# Patient Record
Sex: MALE | Race: WHITE | Employment: FULL TIME | URBAN - METROPOLITAN AREA
[De-identification: names, ages, dates, MRNs, and addresses within clinical notes are randomized per-mention and may not be internally consistent; named-entity substitution may affect disease eponyms.]

---

## 2019-09-10 ENCOUNTER — HOSPITAL ENCOUNTER (EMERGENCY)
Age: 44
Discharge: HOME | End: 2019-09-11
Attending: EMERGENCY MEDICINE

## 2019-09-10 ENCOUNTER — APPOINTMENT (OUTPATIENT)
Dept: GENERAL RADIOLOGY | Age: 44
End: 2019-09-10

## 2019-09-10 VITALS
TEMPERATURE: 99.3 F | HEIGHT: 72 IN | SYSTOLIC BLOOD PRESSURE: 119 MMHG | OXYGEN SATURATION: 98 % | BODY MASS INDEX: 31.02 KG/M2 | HEART RATE: 97 BPM | WEIGHT: 229 LBS | DIASTOLIC BLOOD PRESSURE: 84 MMHG

## 2019-09-10 DIAGNOSIS — J06.9 UPPER RESPIRATORY TRACT INFECTION, UNSPECIFIED TYPE: Primary | ICD-10-CM

## 2019-09-10 PROCEDURE — 99284 EMERGENCY DEPT VISIT MOD MDM: CPT

## 2019-09-10 PROCEDURE — 71046 X-RAY EXAM CHEST 2 VIEWS: CPT

## 2019-09-10 RX ORDER — FLUTICASONE PROPIONATE 50 MCG
1 SPRAY, SUSPENSION (ML) NASAL DAILY
COMMUNITY

## 2019-09-10 RX ORDER — PREDNISONE 20 MG/1
40 TABLET ORAL DAILY
Qty: 10 TABLET | Refills: 0 | Status: SHIPPED | OUTPATIENT
Start: 2019-09-10 | End: 2019-09-15

## 2019-09-10 RX ORDER — SULFAMETHOXAZOLE AND TRIMETHOPRIM 200; 40 MG/5ML; MG/5ML
160 SUSPENSION ORAL 2 TIMES DAILY
COMMUNITY
End: 2020-11-04

## 2019-09-10 RX ORDER — GUAIFENESIN, PSEUDOEPHEDRINE HYDROCHLORIDE 600; 60 MG/1; MG/1
1 TABLET, EXTENDED RELEASE ORAL EVERY 12 HOURS PRN
Qty: 20 TABLET | Refills: 0 | Status: SHIPPED | OUTPATIENT
Start: 2019-09-10 | End: 2020-11-04

## 2019-09-10 ASSESSMENT — ENCOUNTER SYMPTOMS
DIARRHEA: 0
TROUBLE SWALLOWING: 0
EYES NEGATIVE: 1
VOMITING: 1
COUGH: 1
ABDOMINAL PAIN: 0
SORE THROAT: 0
CONSTIPATION: 0

## 2019-09-10 ASSESSMENT — PAIN DESCRIPTION - PAIN TYPE: TYPE: ACUTE PAIN

## 2019-09-10 ASSESSMENT — PAIN SCALES - GENERAL: PAINLEVEL_OUTOF10: 6

## 2019-09-10 NOTE — LETTER
[facility] Emergency Department  [facility] [address] [address]  Phone: [phone]  Fax: [phone]               September 11, 2019    Patient: Gris Sullivan   YOB: 1975   Date of Visit: 9/10/2019       To Whom It May Concern:    Linda Bhatt was seen and treated in our emergency department on 9/10/2019. He may return to work on 9/12/19.       Sincerely,       Jordan Ospina MD         Signature:__________________________________

## 2020-06-09 ENCOUNTER — APPOINTMENT (OUTPATIENT)
Dept: GENERAL RADIOLOGY | Age: 45
End: 2020-06-09

## 2020-06-09 ENCOUNTER — HOSPITAL ENCOUNTER (EMERGENCY)
Age: 45
Discharge: HOME | End: 2020-06-09
Attending: EMERGENCY MEDICINE

## 2020-06-09 VITALS
RESPIRATION RATE: 19 BRPM | TEMPERATURE: 98.9 F | SYSTOLIC BLOOD PRESSURE: 147 MMHG | HEART RATE: 81 BPM | OXYGEN SATURATION: 99 % | DIASTOLIC BLOOD PRESSURE: 89 MMHG

## 2020-06-09 LAB
ANION GAP SERPL CALCULATED.3IONS-SCNC: 11 MMOL/L (ref 3–16)
BASOPHILS ABSOLUTE: 0.1 K/UL (ref 0–0.2)
BASOPHILS RELATIVE PERCENT: 1 %
BILIRUBIN URINE: NEGATIVE
BLOOD, URINE: NEGATIVE
BUN BLDV-MCNC: 11 MG/DL (ref 7–20)
CALCIUM SERPL-MCNC: 9.2 MG/DL (ref 8.3–10.6)
CHLORIDE BLD-SCNC: 101 MMOL/L (ref 99–110)
CHP ED QC CHECK: NORMAL
CLARITY: CLEAR
CO2: 26 MMOL/L (ref 21–32)
COLOR: YELLOW
CREAT SERPL-MCNC: 0.7 MG/DL (ref 0.9–1.3)
EOSINOPHILS ABSOLUTE: 0.2 K/UL (ref 0–0.6)
EOSINOPHILS RELATIVE PERCENT: 1.8 %
GFR AFRICAN AMERICAN: >60
GFR NON-AFRICAN AMERICAN: >60
GLUCOSE BLD-MCNC: 231 MG/DL
GLUCOSE BLD-MCNC: 231 MG/DL (ref 70–99)
GLUCOSE BLD-MCNC: 254 MG/DL (ref 70–99)
GLUCOSE URINE: 500 MG/DL
HCT VFR BLD CALC: 49.3 % (ref 40.5–52.5)
HEMOGLOBIN: 17.2 G/DL (ref 13.5–17.5)
KETONES, URINE: NEGATIVE MG/DL
LEUKOCYTE ESTERASE, URINE: NEGATIVE
LYMPHOCYTES ABSOLUTE: 2.3 K/UL (ref 1–5.1)
LYMPHOCYTES RELATIVE PERCENT: 23.7 %
MCH RBC QN AUTO: 30.6 PG (ref 26–34)
MCHC RBC AUTO-ENTMCNC: 34.9 G/DL (ref 31–36)
MCV RBC AUTO: 87.8 FL (ref 80–100)
MICROSCOPIC EXAMINATION: YES
MONOCYTES ABSOLUTE: 0.7 K/UL (ref 0–1.3)
MONOCYTES RELATIVE PERCENT: 7.8 %
NEUTROPHILS ABSOLUTE: 6.3 K/UL (ref 1.7–7.7)
NEUTROPHILS RELATIVE PERCENT: 65.7 %
NITRITE, URINE: NEGATIVE
PDW BLD-RTO: 13.1 % (ref 12.4–15.4)
PERFORMED ON: ABNORMAL
PH UA: 6 (ref 5–8)
PLATELET # BLD: 224 K/UL (ref 135–450)
PMV BLD AUTO: 9 FL (ref 5–10.5)
POTASSIUM REFLEX MAGNESIUM: 4.5 MMOL/L (ref 3.5–5.1)
PROTEIN UA: 30 MG/DL
RBC # BLD: 5.61 M/UL (ref 4.2–5.9)
RBC UA: NORMAL /HPF (ref 0–4)
SODIUM BLD-SCNC: 138 MMOL/L (ref 136–145)
SPECIFIC GRAVITY UA: 1.02 (ref 1–1.03)
URINE TYPE: ABNORMAL
UROBILINOGEN, URINE: 0.2 E.U./DL
WBC # BLD: 9.5 K/UL (ref 4–11)
WBC UA: NORMAL /HPF (ref 0–5)

## 2020-06-09 PROCEDURE — 73610 X-RAY EXAM OF ANKLE: CPT

## 2020-06-09 PROCEDURE — 99283 EMERGENCY DEPT VISIT LOW MDM: CPT

## 2020-06-09 PROCEDURE — 85025 COMPLETE CBC W/AUTO DIFF WBC: CPT

## 2020-06-09 PROCEDURE — 80048 BASIC METABOLIC PNL TOTAL CA: CPT

## 2020-06-09 PROCEDURE — 73630 X-RAY EXAM OF FOOT: CPT

## 2020-06-09 PROCEDURE — 81001 URINALYSIS AUTO W/SCOPE: CPT

## 2020-06-09 ASSESSMENT — ENCOUNTER SYMPTOMS
EYE DISCHARGE: 0
SHORTNESS OF BREATH: 0
COUGH: 0
NAUSEA: 0
VOMITING: 0
EYE REDNESS: 0
SORE THROAT: 0
ABDOMINAL PAIN: 0

## 2020-06-09 ASSESSMENT — PAIN SCALES - GENERAL: PAINLEVEL_OUTOF10: 8

## 2020-06-09 NOTE — ED PROVIDER NOTES
[address] ENCOUNTER          PHYSICIAN ASSISTANT NOTE       Date of evaluation: 6/9/2020    Chief Complaint     Foot Injury    History of Present Illness     HPI:  This is a 54 y.o. 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 He states he cannot bear weight on the right foot. He denies ankle or knee pain. He states he has chronic 4-5 year \"numbness/pins and needle\" sensation to the BL lower extremities. He denies fever, chills, nausea, vomiting, chest pain, shortness of breath, abdominal pain or changes to bowel or bladder habits. He denies any calf pain. With the exception of the above, there are no aggravating or alleviating factors. Review of Systems     Review of Systems   Constitutional: Negative for chills and fever. HENT: Negative for sore throat. Eyes: Negative for discharge and redness. Respiratory: Negative for cough and shortness of breath. Cardiovascular: Negative for chest pain. Gastrointestinal: Negative for abdominal pain, nausea and vomiting. Genitourinary: Negative for dysuria and hematuria. Musculoskeletal: Positive for gait problem (cant bear weight on right foot). Skin: Negative for rash. Neurological: Negative for facial asymmetry and light-headedness. Psychiatric/Behavioral: Negative. Past Medical, Surgical, Family, and Social History     He has no past medical history on file. He has a past surgical history that includes Tonsillectomy and fracture surgery. His family history is not on file. He reports that he has been smoking cigarettes. He has been smoking about 2.50 packs per day. He has never used smokeless tobacco. He reports current alcohol use.  He reports that bunion present. No deformity. Neurological:      General: No focal deficit present. Mental Status: He is alert and oriented to person, place, and time. Comments: Sensation grossly intact to light touch in the lateral, medial, plantar and dorsal surfaces of right foot   Psychiatric:         Mood and Affect: Mood normal.         Behavior: Behavior normal.       Diagnostic Results     RADIOLOGY:  XR FOOT RIGHT (MIN 3 VIEWS)   Final Result      1. No acute fracture. 2. Degenerative changes as above. 3. Hallux valgus deformity on these nonweightbearing views. 4. Probable remote fracture medial malleolus. XR ANKLE RIGHT (MIN 3 VIEWS)   Final Result      1. No acute fracture.               LABS:   Results for orders placed or performed during the hospital encounter of 06/09/20   Urinalysis, reflex to microscopic   Result Value Ref Range    Color, UA Yellow Straw/Yellow    Clarity, UA Clear Clear    Glucose, Ur 500 (A) Negative mg/dL    Bilirubin Urine Negative Negative    Ketones, Urine Negative Negative mg/dL    Specific Gravity, UA 1.025 1.005 - 1.030    Blood, Urine Negative Negative    pH, UA 6.0 5.0 - 8.0    Protein, UA 30 (A) Negative mg/dL    Urobilinogen, Urine 0.2 <2.0 E.U./dL    Nitrite, Urine Negative Negative    Leukocyte Esterase, Urine Negative Negative    Microscopic Examination YES     Urine Type NotGiven    Basic Metabolic Panel w/ Reflex to MG   Result Value Ref Range    Sodium 138 136 - 145 mmol/L    Potassium reflex Magnesium 4.5 3.5 - 5.1 mmol/L    Chloride 101 99 - 110 mmol/L    CO2 26 21 - 32 mmol/L    Anion Gap 11 3 - 16    Glucose 254 (H) 70 - 99 mg/dL    BUN 11 7 - 20 mg/dL    CREATININE 0.7 (L) 0.9 - 1.3 mg/dL    GFR Non-African American >60 >60    GFR African American >60 >60    Calcium 9.2 8.3 - 10.6 mg/dL   CBC Auto Differential   Result Value Ref Range    WBC 9.5 4.0 - 11.0 K/uL    RBC 5.61 4.20 - 5.90 M/uL    Hemoglobin 17.2 13.5 - 17.5 g/dL    Hematocrit 49.3 40.5 - 52.5 %    MCV 87.8 80.0 - 100.0 fL    MCH 30.6 26.0 - 34.0 pg    MCHC 34.9 31.0 - 36.0 g/dL    RDW 13.1 12.4 - 15.4 %    Platelets 322 393 - 740 K/uL    MPV 9.0 5.0 - 10.5 fL    Neutrophils % 65.7 %    Lymphocytes % 23.7 %    Monocytes % 7.8 %    Eosinophils % 1.8 %    Basophils % 1.0 %    Neutrophils Absolute 6.3 1.7 - 7.7 K/uL    Lymphocytes Absolute 2.3 1.0 - 5.1 K/uL    Monocytes Absolute 0.7 0.0 - 1.3 K/uL    Eosinophils Absolute 0.2 0.0 - 0.6 K/uL    Basophils Absolute 0.1 0.0 - 0.2 K/uL   Microscopic Urinalysis   Result Value Ref Range    WBC, UA 0-2 0 - 5 /HPF    RBC, UA None seen 0 - 4 /HPF   POC Glucose   Result Value Ref Range    Glucose 231 mg/dL    QC OK? y    POCT Glucose   Result Value Ref Range    POC Glucose 231 (H) 70 - 99 mg/dl    Performed on ACCU-CHEK        RECENT VITALS:  BP: (!) 147/89, Temp: 98.9 °F (37.2 °C), Pulse: 81, Resp: 19, SpO2: 99 %     Procedures     None    ED Course     Nursing Notes, Past Medical Hx,Past Surgical Hx, Social Hx, Allergies, and Family Hx were reviewed. The patient was given the following medications:  Orders Placed This Encounter   Medications    metFORMIN (GLUCOPHAGE) 500 MG tablet     Sig: Take 1 tablet by mouth daily (with breakfast)     Dispense:  30 tablet     Refill:  0       CONSULTS:  None    MEDICAL DECISION MAKING / ASSESSMENT / PLAN   Vital signs, medical history, social history, allergies and nursing notes reviewed. Vitals:  BP (!) 147/89   Pulse 81   Temp 98.9 °F (37.2 °C) (Oral)   Resp 19   SpO2 99%     Briefly this is a 54 y.o. male who presents to the emergency department with foot injury. Patient presented afebrile with normal vitals . Patient was in no acute distress, nontoxic and non-hypoxic. Patient was able to complete full sentences at bedside. Patient was not using accessory muscles. Patient was able to cooperate with history and physical exam.  Patient's previous charts, labs and imaging was reviewed.   Please see HPI and physical for further details. On exam, patient overall well-appearing, he is in no acute distress. Lungs are clear to auscultation bilaterally and heart rate and rhythm are regular. The patient has tenderness palpation to the dorsum of the right foot most prominent over the lateral aspect of the right foot as well as the second metatarsal.  He has 2+ dorsalis pedis pulses and has sensation intact to light touch to the dorsum, plantar, lateral and medial aspects of the right foot. He is unable to bear weight to the right foot. Plain radiographs of the right foot and right ankle reveal no acute osseous abnormalities, he does have hallux valgus to the right foot which is consistent with his physical exam.  Given mechanism of injury as well as physical exam there is concern for potential occult fracture such as fifth metatarsal fracture, less likely Lisfranc injury. Even his inability to bear weight we will place the patient in a short leg splint and provide him follow-up with podiatry, the possibility of occult fracture is discussed with the patient and he understands the importance of follow-up with podiatry to ensure he is no complications of possible occult fracture. He is also provided crutches for comfort and is encouraged not to bear weight until his follow-up with podiatry. Additionally, the patient states he has concerns that he may have diabetes given chronic numbness to his bilateral lower extremities from the knee down, his point-of-care glucose today is 231, laboratory studies include CBC which reveals no leukocytosis or anemia, basic metabolic panel reveals glucose of 254, creatinine is 0.7 BUN is within normal limits no electrolyte abnormalities are identified. Urinalysis reveals 500 glucose and 30 protein, no signs of infection are noted. These findings are consistent with hyperglycemia and possible nephrotic syndrome secondary to untreated diabetes.   The patient is informed of these findings as well as the complications of chronic untreated diabetes and the importance of appropriate primary care follow-up, he is provided a referral to the [facility] resident clinic as well as to the [facility] podiatry clinic. He will be started on metformin today and he is provided educational information regarding diet and diabetes. He is hemodynamically stable throughout his ED course, he has tolerated all intervention today without complication. The patient was seen and evaluated by the attending physician Dr. Charlotte Moreland who agreed with the assessment and plan. The patient and / or the family were informed of the results of any tests, a time was given to answer questions, a plan was proposed and they agreed with plan. At this point in time, patient is stable for discharge. Patient given strict return precautions as outlined in the AVS. Patient was agreeable and understanding to this plan of care. Prior to discharge, patient was ambulatory and PO tolerant. Clinical Impression     1. Hyperglycemia    2.  Injury of right foot, initial encounter        Disposition     PATIENT REFERRED TO:  [facility]  [address]   [phone]    Schedule an appointment as soon as possible for a visit in 1 week  For ED follow up regarding likely diabetes    [address]  [phone]    Schedule an appointment as soon as possible for a visit in 3 days  For re-evaluation of foot injury    [facility] Emergency Department  [address]  [phone]    If symptoms worsen      DISCHARGE MEDICATIONS:  Discharge Medication List as of 6/9/2020 12:34 PM      START taking these medications    Details   metFORMIN (GLUCOPHAGE) 500 MG tablet Take 1 tablet by mouth daily (with breakfast), Disp-30 tablet, R-0Print             DISPOSITION     Discharge      Epifanio Sylvester PA-C  06/09/20 [id number]

## 2020-06-09 NOTE — ED NOTES
Patient discharged to home with all belongings. All discharge and follow up information discussed. Patient verbalized understanding and denies needs or questions. Patient is alert, oriented x4, no signs of acute distress.       Stephanie Reynolds RN  06/09/20 [id number]

## 2020-06-09 NOTE — ED PROVIDER NOTES
ED Attending Attestation Note     Date of evaluation: 6/9/2020    This patient was seen by the advance practice provider. I have seen and examined the patient, agree with the workup, evaluation, management and diagnosis. The care plan has been discussed. My assessment reveals an overall well-appearing gentleman, in no acute distress. He presents with pain along the dorsal lateral aspect of the right midfoot, which occurred with an inversion injury while standing up from a rolling chair. Plain films show no fracture, but patient is not able to bear weight, so a splint was placed and he is being referred to podiatry.        Jong Sanchez MD  06/18/20 [id number]

## 2020-06-10 ENCOUNTER — TELEPHONE (OUTPATIENT)
Dept: PRIMARY CARE CLINIC | Age: 45
End: 2020-06-10

## 2020-11-03 ENCOUNTER — NURSE TRIAGE (OUTPATIENT)
Dept: OTHER | Facility: CLINIC | Age: 45
End: 2020-11-03

## 2020-11-03 NOTE — TELEPHONE ENCOUNTER
Numbness and tingling from feet all the way up leg, thinks it might be diabetic neuropathy. States it is worse at night Tingling from left ring finger all the way up to chest. Pt reported heart palpatations and shortness of breath for 2-3 minutes-2 days ago. Reason for Disposition   Numbness or tingling in one or both feet is a chronic symptom (recurrent or ongoing problem lasting > 4 weeks)    Answer Assessment - Initial Assessment Questions  1. SYMPTOM: \"What is the main symptom you are concerned about? \" (e.g., weakness, numbness)      See above    2. ONSET: \"When did this start? \" (minutes, hours, days; while sleeping)      About 4 years ago    3. LAST NORMAL: \"When was the last time you were normal (no symptoms)? \"      See above    4. PATTERN \"Does this come and go, or has it been constant since it started? \"  \"Is it present now? \"      Constant    5. CARDIAC SYMPTOMS: \"Have you had any of the following symptoms: chest pain, difficulty breathing, palpitations? \"      Palpitations and SOB 2 nights ago    6. NEUROLOGIC SYMPTOMS: \"Have you had any of the following symptoms: headache, dizziness, vision loss, double vision, changes in speech, unsteady on your feet? \"      Denies    7. OTHER SYMPTOMS: \"Do you have any other symptoms? \"      See above    8. PREGNANCY: \"Is there any chance you are pregnant? \" \"When was your last menstrual period? \"      Na    Protocols used: NEUROLOGIC DEFICIT-ADULT-[place]      Patient called pre-service center [facility]) to schedule appointment, with red flag complaint, transferred to RN access for triage. See above questions and answers. Caller talking full sentences without any distress on phone. Discussed disposition and patient agreeable. Discussed potential consequences for not following disposition recommendation. Aware to call back with any concerns or persistent, worsening, or new symptoms develop. Warm transfer to [facility] scheduling for appointment.     Attention Provider: Thank you for allowing me to participate in the care of your patient. The  patient was connected to triage in response to information provided to the ECC. Please do not respond through this encounter as the response is not directed to a shared pool.

## 2020-11-04 ENCOUNTER — OFFICE VISIT (OUTPATIENT)
Dept: FAMILY MEDICINE CLINIC | Age: 45
End: 2020-11-04

## 2020-11-04 VITALS
WEIGHT: 240 LBS | HEIGHT: 72 IN | OXYGEN SATURATION: 97 % | DIASTOLIC BLOOD PRESSURE: 84 MMHG | HEART RATE: 70 BPM | SYSTOLIC BLOOD PRESSURE: 118 MMHG | TEMPERATURE: 97.7 F | BODY MASS INDEX: 32.51 KG/M2

## 2020-11-04 LAB — HBA1C MFR BLD: 9.8 %

## 2020-11-04 PROCEDURE — 83036 HEMOGLOBIN GLYCOSYLATED A1C: CPT | Performed by: FAMILY MEDICINE

## 2020-11-04 PROCEDURE — 99203 OFFICE O/P NEW LOW 30 MIN: CPT | Performed by: FAMILY MEDICINE

## 2020-11-04 RX ORDER — CETIRIZINE HYDROCHLORIDE 10 MG/1
10 TABLET ORAL DAILY
COMMUNITY

## 2020-11-04 RX ORDER — AZITHROMYCIN 500 MG/1
500 TABLET, FILM COATED ORAL DAILY
Qty: 1 PACKET | Refills: 0 | Status: SHIPPED | OUTPATIENT
Start: 2020-11-04 | End: 2020-11-07

## 2020-11-04 ASSESSMENT — ENCOUNTER SYMPTOMS
COUGH: 1
BLOOD IN STOOL: 0
NAUSEA: 0
SHORTNESS OF BREATH: 0
VOMITING: 0
SORE THROAT: 0
ABDOMINAL PAIN: 0

## 2020-11-04 ASSESSMENT — PATIENT HEALTH QUESTIONNAIRE - PHQ9
1. LITTLE INTEREST OR PLEASURE IN DOING THINGS: 0
2. FEELING DOWN, DEPRESSED OR HOPELESS: 0
SUM OF ALL RESPONSES TO PHQ QUESTIONS 1-9: 0
SUM OF ALL RESPONSES TO PHQ QUESTIONS 1-9: 0
SUM OF ALL RESPONSES TO PHQ9 QUESTIONS 1 & 2: 0
SUM OF ALL RESPONSES TO PHQ QUESTIONS 1-9: 0

## 2020-11-04 NOTE — PROGRESS NOTES
Chief Complaint   Patient presents with    New Patient     Hasn't had insurance/pcp.  Numbness     foot numbess (pins and needles) x3-4 years    Finger Pain     left ring finger shooting up left arm.  Palpitations     Heart palpitations for about 3-4 minutes about 3 days ago.  Congestion     Patient complains of \"chest cold\"     Erectile Dysfunction           Jen Chance is a 54 y.o. male who presents to be established. Pt reports a tingling shooting feeling from his L ring finger into his L shoulder 3 days ago which was followed by palpitations which lasted about 3 min. Pt denies any associated CP or SOB. Of note pt has been taking an OTC decongestant daily for PND that he has been dealing with over the past month     Pt was Dx with diabetes back in 06/2020 during an ER visit in which he fractured bones in his R foot and was Rx metformin 500 mg daily. Pt does not check his blood sugars and has been trying to wean down his soda use. Pt did see Podiatry regarding his R foot fractures and was in a [facility] for 6 weeks with good healing    Pt does have a hx of allergies and takes OTC zyrtec and flonase medications daily     Pt is a smoker of 2.5 PPD; positive alcohol use 6 pack beer twice a week; Positive marijuana use nightly for chronic foot burning pain x 4 years    FHx negative for CAD or diabetes; Pt is unemployed and has no medical insurance; Pt states that he does eat a lot of salty foods      Review of Systems   Constitutional: Positive for fatigue. Negative for chills and fever. HENT: Positive for congestion (x 1 month) and postnasal drip (x 1 month). Negative for nosebleeds and sore throat. Negative loss of taste or smell   Eyes:        Negative blurred vision or diplopia   Respiratory: Positive for cough (mucousy x 1 month). Negative for shortness of breath. Cardiovascular: Positive for palpitations (episode 3 days ago) and leg swelling. Negative for chest pain. Gastrointestinal: Negative for abdominal pain, blood in stool, nausea and vomiting. Negative melena or indigestion   Endocrine: Negative for polydipsia and polyuria. Genitourinary: Negative for dysuria and hematuria. Positive erectile dysfunction at times   Musculoskeletal: Negative for arthralgias. Skin: Negative for rash. Neurological: Negative for dizziness, seizures, syncope, speech difficulty, weakness and headaches. Positive chronic constant burning pain in feet x 4 years   Psychiatric/Behavioral: Negative for dysphoric mood. The patient is not nervous/anxious. Vitals:    11/04/20 0908   BP: 118/84   Pulse: 70   Temp: 97.7 °F (36.5 °C)   SpO2: 97%     HgBA1c = 9.8     Physical Exam  Vitals signs reviewed. Constitutional:       General: He is not in acute distress. HENT:      Nose:      Comments: No sinus tendernous to percussion noted      Mouth/Throat:      Mouth: Mucous membranes are moist.      Pharynx: Oropharynx is clear. Eyes:      General: No scleral icterus. Comments: Pink conjunctivae    Neck:      Thyroid: No thyromegaly. Comments: No carotid bruits noted B/L  Cardiovascular:      Heart sounds: Normal heart sounds. No murmur. No friction rub. No gallop. Pulmonary:      Effort: Pulmonary effort is normal.      Breath sounds: Normal breath sounds. Abdominal:      Palpations: Abdomen is soft. Tenderness: There is no abdominal tenderness. Musculoskeletal:      Comments: Positive 4+ leg edema but no calf tendernous to palpation noted B/L   Lymphadenopathy:      Cervical: No cervical adenopathy. Skin:     Findings: No rash. Neurological:      Mental Status: He is alert. Comments: Cranial nerves 2 - 12 grossly intact; Muscle strength 5/5 throughout   Psychiatric:         Mood and Affect: Mood normal.         ASSESSMENT AND PLAN    1.  Chronic allergic rhinitis  Pt was told to continue using flonase but to hold his zyrtec medication for 1 week while on antibiotics. 2. Acute sinusitis, recurrence not specified, unspecified location  Will treat patient with antibiotics and patient was told to notify MD if you note no improvement in 3 - 4 days. Pt is with clear lung fields on PE and was told to stop taking OTC decongestant medication as it is the most likely cause of his recent episode of palpitations  - azithromycin (ZITHROMAX) 500 MG tablet; Take 1 tablet by mouth daily for 3 days  Dispense: 1 packet; Refill: 0    3. Tobacco abuse  Patient was advised to Quit Smoking and smoking cessation techniques were discussed with patient (time spent > 3 min)    4. Type 2 diabetes mellitus with diabetic neuropathy, without long-term current use of insulin (HCC)  Is uncontrolled with HgbA1c level 9.8 and will thus increase pt's metformin dosage and patient was told to follow a stricter diabetic diet; avoiding concentrated sweets and consuming a low carbohydrate diet and will reevaluate pt in 6 weeks. Pt's chronic burning foot pain is most likely a secondary neuropathy from this condition  - POCT glycosylated hemoglobin (Hb A1C)  - metFORMIN (GLUCOPHAGE) 500 MG tablet; Take 1 tablet by mouth 2 times daily (with meals)  Dispense: 60 tablet; Refill: 1  - CBC Auto Differential; Future  - Comprehensive Metabolic Panel; Future  - TSH with Reflex; Future    5. Leg swelling  Unsure as to etiology and will check bloodwork for further evaluation and pt was told to follow a low sodium diet and to keep his legs elevated as much as possible. Pt is with stable VS and has no lung rales on PE  - CBC Auto Differential; Future  - Comprehensive Metabolic Panel; Future  - TSH with Reflex; Future        Cristela Palm MD      Return in about 6 weeks (around 12/16/2020).        Patient Instructions     Patient Education      Go to the ER ASAP if you develop any chest pain, shortness of breath, facial droop, slurred speech, weakness/numbness in your arms or legs or double vision! Learning About Diabetes Food Guidelines  Your Care Instructions     Meal planning is important to manage diabetes. It helps keep your blood sugar at a target level (which you set with your doctor). You don't have to eat special foods. You can eat what your family eats, including sweets once in a while. But you do have to pay attention to how often you eat and how much you eat of certain foods. You may want to work with a dietitian or a certified diabetes educator (CDE) to help you plan meals and snacks. A dietitian or CDE can also help you lose weight if that is one of your goals. What should you know about eating carbs? Managing the amount of carbohydrate (carbs) you eat is an important part of healthy meals when you have diabetes. Carbohydrate is found in many foods. · Learn which foods have carbs. And learn the amounts of carbs in different foods. ? Bread, cereal, pasta, and rice have about 15 grams of carbs in a serving. A serving is 1 slice of bread (1 ounce), ½ cup of cooked cereal, or 1/3 cup of cooked pasta or rice. ? Fruits have 15 grams of carbs in a serving. A serving is 1 small fresh fruit, such as an apple or orange; ½ of a banana; ½ cup of cooked or canned fruit; ½ cup of fruit juice; 1 cup of melon or raspberries; or 2 tablespoons of dried fruit. ? Milk and no-sugar-added yogurt have 15 grams of carbs in a serving. A serving is 1 cup of milk or 2/3 cup of no-sugar-added yogurt. ? Starchy vegetables have 15 grams of carbs in a serving. A serving is ½ cup of mashed potatoes or sweet potato; 1 cup winter squash; ½ of a small baked potato; ½ cup of cooked beans; or ½ cup cooked corn or green peas. · Learn how much carbs to eat each day and at each meal. A dietitian or CDE can teach you how to keep track of the amount of carbs you eat. This is called carbohydrate counting. · If you are not sure how to count carbohydrate grams, use the Plate Method to plan meals.  It is a good, quick way to make sure that you have a balanced meal. It also helps you spread carbs throughout the day. ? Divide your plate by types of foods. Put non-starchy vegetables on half the plate, meat or other protein food on one-quarter of the plate, and a grain or starchy vegetable in the final quarter of the plate. To this you can add a small piece of fruit and 1 cup of milk or yogurt, depending on how many carbs you are supposed to eat at a meal.  · Try to eat about the same amount of carbs at each meal. Do not \"save up\" your daily allowance of carbs to eat at one meal.  · Proteins have very little or no carbs per serving. Examples of proteins are beef, chicken, turkey, fish, eggs, tofu, cheese, cottage cheese, and peanut butter. A serving size of meat is 3 ounces, which is about the size of a deck of cards. Examples of meat substitute serving sizes (equal to 1 ounce of meat) are 1/4 cup of cottage cheese, 1 egg, 1 tablespoon of peanut butter, and ½ cup of tofu. How can you eat out and still eat healthy? · Learn to estimate the serving sizes of foods that have carbohydrate. If you measure food at home, it will be easier to estimate the amount in a serving of restaurant food. · If the meal you order has too much carbohydrate (such as potatoes, corn, or baked beans), ask to have a low-carbohydrate food instead. Ask for a salad or green vegetables. · If you use insulin, check your blood sugar before and after eating out to help you plan how much to eat in the future. · If you eat more carbohydrate at a meal than you had planned, take a walk or do other exercise. This will help lower your blood sugar. What else should you know? · Limit saturated fat, such as the fat from meat and dairy products. This is a healthy choice because people who have diabetes are at higher risk of heart disease. So choose lean cuts of meat and nonfat or low-fat dairy products.  Use olive or canola oil instead of butter or shortening when cooking. · Don't skip meals. Your blood sugar may drop too low if you skip meals and take insulin or certain medicines for diabetes. · Check with your doctor before you drink alcohol. Alcohol can cause your blood sugar to drop too low. Alcohol can also cause a bad reaction if you take certain diabetes medicines. Follow-up care is a key part of your treatment and safety. Be sure to make and go to all appointments, and call your doctor if you are having problems. It's also a good idea to know your test results and keep a list of the medicines you take. Where can you learn more? Go to https://Nextlandingpepiceweb.2houses. org and sign in to your "[employer]" account. Enter L636 in the [employer] box to learn more about \"Learning About Diabetes Food Guidelines. \"     If you do not have an account, please click on the \"Sign Up Now\" link. Current as of: December 20, 2019               Content Version: 12.6  © 9013-2667 [employer]. Care instructions adapted under license by [facility] ([facility]). If you have questions about a medical condition or this instruction, always ask your healthcare professional. [place] [address] any warranty or liability for your use of this information.

## 2020-11-04 NOTE — PATIENT INSTRUCTIONS
Patient Education      Go to the ER ASAP if you develop any chest pain, shortness of breath, facial droop, slurred speech, weakness/numbness in your arms or legs or double vision! Learning About Diabetes Food Guidelines  Your Care Instructions     Meal planning is important to manage diabetes. It helps keep your blood sugar at a target level (which you set with your doctor). You don't have to eat special foods. You can eat what your family eats, including sweets once in a while. But you do have to pay attention to how often you eat and how much you eat of certain foods. You may want to work with a dietitian or a certified diabetes educator (CDE) to help you plan meals and snacks. A dietitian or CDE can also help you lose weight if that is one of your goals. What should you know about eating carbs? Managing the amount of carbohydrate (carbs) you eat is an important part of healthy meals when you have diabetes. Carbohydrate is found in many foods. · Learn which foods have carbs. And learn the amounts of carbs in different foods. ? Bread, cereal, pasta, and rice have about 15 grams of carbs in a serving. A serving is 1 slice of bread (1 ounce), ½ cup of cooked cereal, or 1/3 cup of cooked pasta or rice. ? Fruits have 15 grams of carbs in a serving. A serving is 1 small fresh fruit, such as an apple or orange; ½ of a banana; ½ cup of cooked or canned fruit; ½ cup of fruit juice; 1 cup of melon or raspberries; or 2 tablespoons of dried fruit. ? Milk and no-sugar-added yogurt have 15 grams of carbs in a serving. A serving is 1 cup of milk or 2/3 cup of no-sugar-added yogurt. ? Starchy vegetables have 15 grams of carbs in a serving. A serving is ½ cup of mashed potatoes or sweet potato; 1 cup winter squash; ½ of a small baked potato; ½ cup of cooked beans; or ½ cup cooked corn or green peas.   · Learn how much carbs to eat each day and at each meal. A dietitian or CDE can teach you how to keep track of the amount of carbs you eat. This is called carbohydrate counting. · If you are not sure how to count carbohydrate grams, use the Plate Method to plan meals. It is a good, quick way to make sure that you have a balanced meal. It also helps you spread carbs throughout the day. ? Divide your plate by types of foods. Put non-starchy vegetables on half the plate, meat or other protein food on one-quarter of the plate, and a grain or starchy vegetable in the final quarter of the plate. To this you can add a small piece of fruit and 1 cup of milk or yogurt, depending on how many carbs you are supposed to eat at a meal.  · Try to eat about the same amount of carbs at each meal. Do not \"save up\" your daily allowance of carbs to eat at one meal.  · Proteins have very little or no carbs per serving. Examples of proteins are beef, chicken, turkey, fish, eggs, tofu, cheese, cottage cheese, and peanut butter. A serving size of meat is 3 ounces, which is about the size of a deck of cards. Examples of meat substitute serving sizes (equal to 1 ounce of meat) are 1/4 cup of cottage cheese, 1 egg, 1 tablespoon of peanut butter, and ½ cup of tofu. How can you eat out and still eat healthy? · Learn to estimate the serving sizes of foods that have carbohydrate. If you measure food at home, it will be easier to estimate the amount in a serving of restaurant food. · If the meal you order has too much carbohydrate (such as potatoes, corn, or baked beans), ask to have a low-carbohydrate food instead. Ask for a salad or green vegetables. · If you use insulin, check your blood sugar before and after eating out to help you plan how much to eat in the future. · If you eat more carbohydrate at a meal than you had planned, take a walk or do other exercise. This will help lower your blood sugar. What else should you know? · Limit saturated fat, such as the fat from meat and dairy products.  This is a healthy choice because people who have diabetes are at higher risk of heart disease. So choose lean cuts of meat and nonfat or low-fat dairy products. Use olive or canola oil instead of butter or shortening when cooking. · Don't skip meals. Your blood sugar may drop too low if you skip meals and take insulin or certain medicines for diabetes. · Check with your doctor before you drink alcohol. Alcohol can cause your blood sugar to drop too low. Alcohol can also cause a bad reaction if you take certain diabetes medicines. Follow-up care is a key part of your treatment and safety. Be sure to make and go to all appointments, and call your doctor if you are having problems. It's also a good idea to know your test results and keep a list of the medicines you take. Where can you learn more? Go to https://GestSure TechnologiespeMagMe.[employer]. org and sign in to your [employer] account. Enter M477 in the [employer] box to learn more about \"Learning About Diabetes Food Guidelines. \"     If you do not have an account, please click on the \"Sign Up Now\" link. Current as of: December 20, 2019               Content Version: 12.6  © 1716-4757 [employer]. Care instructions adapted under license by [facility] ([facility]). If you have questions about a medical condition or this instruction, always ask your healthcare professional. [address] any warranty or liability for your use of this information.

## 2020-11-05 LAB
ALBUMIN SERPL-MCNC: 4.5 G/DL
ALP BLD-CCNC: 93 U/L
ALT SERPL-CCNC: 31 U/L
ANION GAP SERPL CALCULATED.3IONS-SCNC: ABNORMAL MMOL/L
AST SERPL-CCNC: 18 U/L
BASOPHILS ABSOLUTE: 0.2 /ΜL
BASOPHILS RELATIVE PERCENT: 2 %
BILIRUB SERPL-MCNC: 0.7 MG/DL (ref 0.1–1.4)
BUN BLDV-MCNC: 10 MG/DL
CALCIUM SERPL-MCNC: 9.6 MG/DL
CHLORIDE BLD-SCNC: 97 MMOL/L
CO2: 23 MMOL/L
CREAT SERPL-MCNC: 0.79 MG/DL
EOSINOPHILS ABSOLUTE: 0.3 /ΜL
EOSINOPHILS RELATIVE PERCENT: 3 %
GFR CALCULATED: ABNORMAL
GLUCOSE BLD-MCNC: 266 MG/DL
HCT VFR BLD CALC: 51.9 % (ref 41–53)
HEMOGLOBIN: 17.6 G/DL (ref 13.5–17.5)
LYMPHOCYTES ABSOLUTE: 2.6 /ΜL
LYMPHOCYTES RELATIVE PERCENT: 26 %
MCH RBC QN AUTO: 30.2 PG
MCHC RBC AUTO-ENTMCNC: 33.9 G/DL
MCV RBC AUTO: 89 FL
MONOCYTES ABSOLUTE: 0.7 /ΜL
MONOCYTES RELATIVE PERCENT: 7 %
NEUTROPHILS ABSOLUTE: 6.3 /ΜL
NEUTROPHILS RELATIVE PERCENT: 62 %
PDW BLD-RTO: 12.3 %
PLATELET # BLD: 261 K/ΜL
PMV BLD AUTO: ABNORMAL FL
POTASSIUM SERPL-SCNC: 4.8 MMOL/L
RBC # BLD: 5.83 10^6/ΜL
SODIUM BLD-SCNC: 135 MMOL/L
TOTAL PROTEIN: 6.7
TSH SERPL DL<=0.05 MIU/L-ACNC: 1.9 UIU/ML
WBC # BLD: 10.1 10^3/ML

## 2021-08-23 ENCOUNTER — TELEPHONE (OUTPATIENT)
Dept: PRIMARY CARE CLINIC | Age: 46
End: 2021-08-23

## 2021-08-23 ENCOUNTER — OFFICE VISIT (OUTPATIENT)
Dept: PRIMARY CARE CLINIC | Age: 46
End: 2021-08-23
Payer: COMMERCIAL

## 2021-08-23 VITALS
HEART RATE: 70 BPM | OXYGEN SATURATION: 98 % | RESPIRATION RATE: 20 BRPM | TEMPERATURE: 97.1 F | WEIGHT: 239 LBS | HEIGHT: 72 IN | DIASTOLIC BLOOD PRESSURE: 86 MMHG | SYSTOLIC BLOOD PRESSURE: 124 MMHG | BODY MASS INDEX: 32.37 KG/M2

## 2021-08-23 DIAGNOSIS — E66.9 OBESITY (BMI 30.0-34.9): ICD-10-CM

## 2021-08-23 DIAGNOSIS — K57.92 DIVERTICULITIS: ICD-10-CM

## 2021-08-23 DIAGNOSIS — E11.65 UNCONTROLLED TYPE 2 DIABETES MELLITUS WITH HYPERGLYCEMIA (HCC): Primary | ICD-10-CM

## 2021-08-23 DIAGNOSIS — Z72.0 TOBACCO ABUSE: ICD-10-CM

## 2021-08-23 LAB
HCT VFR BLD CALC: 55.7 % (ref 40.5–52.5)
HEMOGLOBIN: 19 G/DL (ref 13.5–17.5)
MCH RBC QN AUTO: 30.6 PG (ref 26–34)
MCHC RBC AUTO-ENTMCNC: 34 G/DL (ref 31–36)
MCV RBC AUTO: 90 FL (ref 80–100)
PDW BLD-RTO: 13 % (ref 12.4–15.4)
PLATELET # BLD: 236 K/UL (ref 135–450)
PMV BLD AUTO: 9.5 FL (ref 5–10.5)
RBC # BLD: 6.19 M/UL (ref 4.2–5.9)
WBC # BLD: 9.9 K/UL (ref 4–11)

## 2021-08-23 PROCEDURE — 99215 OFFICE O/P EST HI 40 MIN: CPT | Performed by: FAMILY MEDICINE

## 2021-08-23 PROCEDURE — G8427 DOCREV CUR MEDS BY ELIG CLIN: HCPCS | Performed by: FAMILY MEDICINE

## 2021-08-23 PROCEDURE — 3046F HEMOGLOBIN A1C LEVEL >9.0%: CPT | Performed by: FAMILY MEDICINE

## 2021-08-23 PROCEDURE — 4004F PT TOBACCO SCREEN RCVD TLK: CPT | Performed by: FAMILY MEDICINE

## 2021-08-23 PROCEDURE — G8417 CALC BMI ABV UP PARAM F/U: HCPCS | Performed by: FAMILY MEDICINE

## 2021-08-23 PROCEDURE — 2022F DILAT RTA XM EVC RTNOPTHY: CPT | Performed by: FAMILY MEDICINE

## 2021-08-23 PROCEDURE — 36415 COLL VENOUS BLD VENIPUNCTURE: CPT | Performed by: FAMILY MEDICINE

## 2021-08-23 RX ORDER — CIPROFLOXACIN 500 MG/1
500 TABLET, FILM COATED ORAL 2 TIMES DAILY
Qty: 20 TABLET | Refills: 0 | Status: SHIPPED | OUTPATIENT
Start: 2021-08-23 | End: 2021-09-02

## 2021-08-23 RX ORDER — GLIPIZIDE 5 MG/1
5 TABLET ORAL 2 TIMES DAILY
Qty: 60 TABLET | Refills: 1 | Status: SHIPPED | OUTPATIENT
Start: 2021-08-23 | End: 2021-08-25 | Stop reason: SDUPTHER

## 2021-08-23 RX ORDER — LANCETS 30 GAUGE
1 EACH MISCELLANEOUS DAILY
Qty: 100 EACH | Refills: 2 | Status: SHIPPED | OUTPATIENT
Start: 2021-08-23

## 2021-08-23 RX ORDER — METRONIDAZOLE 500 MG/1
500 TABLET ORAL 3 TIMES DAILY
Qty: 30 TABLET | Refills: 0 | Status: SHIPPED | OUTPATIENT
Start: 2021-08-23 | End: 2021-09-02

## 2021-08-23 SDOH — ECONOMIC STABILITY: FOOD INSECURITY: WITHIN THE PAST 12 MONTHS, THE FOOD YOU BOUGHT JUST DIDN'T LAST AND YOU DIDN'T HAVE MONEY TO GET MORE.: NEVER TRUE

## 2021-08-23 SDOH — ECONOMIC STABILITY: FOOD INSECURITY: WITHIN THE PAST 12 MONTHS, YOU WORRIED THAT YOUR FOOD WOULD RUN OUT BEFORE YOU GOT MONEY TO BUY MORE.: NEVER TRUE

## 2021-08-23 ASSESSMENT — ENCOUNTER SYMPTOMS
NAUSEA: 0
SHORTNESS OF BREATH: 0
COUGH: 0
SORE THROAT: 0
DIARRHEA: 1
VOMITING: 0
BLOOD IN STOOL: 1
ABDOMINAL PAIN: 1

## 2021-08-23 ASSESSMENT — PATIENT HEALTH QUESTIONNAIRE - PHQ9
SUM OF ALL RESPONSES TO PHQ QUESTIONS 1-9: 0
1. LITTLE INTEREST OR PLEASURE IN DOING THINGS: 0
SUM OF ALL RESPONSES TO PHQ QUESTIONS 1-9: 0
SUM OF ALL RESPONSES TO PHQ9 QUESTIONS 1 & 2: 0
2. FEELING DOWN, DEPRESSED OR HOPELESS: 0
SUM OF ALL RESPONSES TO PHQ QUESTIONS 1-9: 0

## 2021-08-23 ASSESSMENT — SOCIAL DETERMINANTS OF HEALTH (SDOH): HOW HARD IS IT FOR YOU TO PAY FOR THE VERY BASICS LIKE FOOD, HOUSING, MEDICAL CARE, AND HEATING?: NOT HARD AT ALL

## 2021-08-23 NOTE — PROGRESS NOTES
Chief Complaint   Patient presents with    Annual Exam    Rectal Bleeding     c/o rectal bleeding last week - pt went to ED, also had diarrhea         Lnydsay Muro is a 51 y.o. male who presents for followup visit regarding diabetes. Pt states that he now has medical insurance. Of note pt has not been taking his metformin medication secondary to SE \"feeling sick\" but has been trying to follow a diabetic diet. Pt does not check his blood sugar    Pt reports 2 hours of diarrhea with abdominal pain last week and went to urgent care and was advised to go to the ER but refused and pt was not Rx any medication. Pt does have a hx of diverticulitis 2009. Pt has never had a colonoscopy    Patient had bloodwork [CBC, CMP, TSH] done 11/05/2021 which was unremarkable except for an elevated glucose 266 and need to make sure that patient is taking his Rx metformin medication and patient also needs to be following a strict diabetic diet; avoiding concentrated sweets and consuming a low carbohydrate diet      Pt does have a hx of allergies and takes OTC zyrtec and flonase medications daily      Pt is a smoker of 2.5 PPD; positive alcohol use 6 pack beer twice a week; Positive marijuana use nightly for chronic foot burning pain x 4 years     FHx negative for CAD or diabetes; Pt states that he does eat a lot of salty foods         Review of Systems   Constitutional: Negative for chills, fatigue and fever. HENT: Negative for congestion, nosebleeds and sore throat. Negative loss of taste or smell   Eyes:        Negative blurred vision or diplopia   Respiratory: Negative for cough and shortness of breath. Cardiovascular: Negative for chest pain, palpitations and leg swelling. Gastrointestinal: Positive for abdominal pain (cramping but improving), blood in stool (last week) and diarrhea (once daily x 5 days). Negative for nausea and vomiting.         Negative melena or indigestion   Endocrine: Negative for polydipsia and polyuria. Genitourinary: Negative for dysuria and hematuria. Musculoskeletal: Negative for arthralgias. Skin: Negative for rash. Neurological: Negative for dizziness, seizures, syncope, speech difficulty, weakness and headaches. Positive chronic constant burning pain in feet x 4 years   Psychiatric/Behavioral: Negative for dysphoric mood. The patient is not nervous/anxious. Vitals:    08/23/21 1029   BP: 124/86   Pulse: 70   Resp: 20   Temp: 97.1 °F (36.2 °C)   SpO2: 98%      RBS = 234    Physical Exam  Vitals reviewed. Constitutional:       General: He is not in acute distress. Appearance: He is obese. HENT:      Mouth/Throat:      Mouth: Mucous membranes are dry. Pharynx: Oropharynx is clear. Eyes:      General: No scleral icterus. Comments: Pink conjunctivae    Neck:      Thyroid: No thyromegaly. Comments: No carotid bruits noted B/L  Cardiovascular:      Heart sounds: Normal heart sounds. No murmur heard. No friction rub. No gallop. Pulmonary:      Effort: Pulmonary effort is normal.      Breath sounds: Normal breath sounds. Abdominal:      Palpations: Abdomen is soft. Tenderness: There is abdominal tenderness (mild suprapubic and LLQ areas). There is no guarding or rebound. Musculoskeletal:      Comments: Positive 3+ leg edema but no calf tendernous to palpation noted B/L   Lymphadenopathy:      Cervical: No cervical adenopathy. Skin:     Findings: No rash. Neurological:      Mental Status: He is alert. Comments: Cranial nerves 2 - 12 grossly intact; Muscle strength 5/5 throughout   Psychiatric:         Mood and Affect: Mood normal.         ASSESSMENT AND PLAN    1.  Uncontrolled type 2 diabetes mellitus with hyperglycemia (HCC)  Will check bloodwork and start pt on glipizide medication for control and patient was told to follow a diabetic diet; avoiding concentrated sweets and consuming a low carbohydrate diet and was also told to record daily blood sugar readings for review at followup office visit in 1 month. VSS  - glipiZIDE (GLUCOTROL) 5 MG tablet; Take 1 tablet by mouth 2 times daily  Dispense: 60 tablet; Refill: 1  - COMPREHENSIVE METABOLIC PANEL  - CBC  - Blood Glucose Monitoring Suppl KIT; 1 kit by Does not apply route daily Dispense according to insurance formulary  Dispense: 1 kit; Refill: 0  - blood glucose test strips (ASCENSIA AUTODISC VI;ONE TOUCH ULTRA TEST VI) strip; 1 each by In Vitro route daily Test as directed,Dispense according to insurance formulary  Dispense: 100 each; Refill: 2  - Lancets MISC; 1 each by Does not apply route daily Test as directed, Dispense according to insurance formulary  Dispense: 100 each; Refill: 2    2. Diverticulitis  Pt with a 5 day hx of diarrhea along with crampy LLQ abdominal pain but denies any associated vomiting or fever/chills and is with no rebound/guarding on abdominal exam and will check bloodwork and treat pt with antibiotics and patient was told to notify MD if symptoms do not resolve with tx  - ciprofloxacin (CIPRO) 500 MG tablet; Take 1 tablet by mouth 2 times daily for 10 days  Dispense: 20 tablet; Refill: 0  - metroNIDAZOLE (FLAGYL) 500 MG tablet; Take 1 tablet by mouth 3 times daily for 10 days  Dispense: 30 tablet; Refill: 0  - COMPREHENSIVE METABOLIC PANEL  - CBC    3. Obesity (BMI 30.0-34. 9)  Patient was advised to perform aerobic exercise and to decrease caloric intake to promote weight loss    4. Tobacco abuse  Patient was advised to Quit Smoking and smoking cessation techniques were discussed with patient (time spent > 3 min)        Bridgett Swain MD      Return in about 1 month (around 9/23/2021). Patient Instructions     Patient Education        Go to the ER ASAP if you develop any chest pain, shortness of breath, facial droop, slurred speech, weakness/numbness in your arms or legs or double vision!     Go to the ER ASAP if you develop any worsening abdominal pain, vomiting or fever! Counting Carbohydrates for Diabetes: Care Instructions  Your Care Instructions     You don't have to eat special foods when you have diabetes. You just have to be careful to eat healthy foods. Carbohydrates (carbs) raise blood sugar higher and quicker than any other nutrient. Carbs are found in desserts, breads and cereals, and fruit. They're also in starchy vegetables. These include potatoes, corn, and grains such as rice and pasta. Carbs are also in milk and yogurt. The more carbs you eat at one time, the higher your blood sugar will rise. Spreading carbs all through the day helps keep your blood sugar levels within your target range. Counting carbs is one of the best ways to keep your blood sugar under control. If you use insulin, counting carbs helps you match the right amount of insulin to the number of grams of carbs in a meal. Then you can change your diet and insulin dose as needed. Testing your blood sugar several times a day can help you learn how carbs affect your blood sugar. A registered dietitian or certified diabetes educator can help you plan meals and snacks. Follow-up care is a key part of your treatment and safety. Be sure to make and go to all appointments, and call your doctor if you are having problems. It's also a good idea to know your test results and keep a list of the medicines you take. How can you care for yourself at home? Know your daily amount of carbohydrates  Your daily amount depends on several things, such as your weight, how active you are, which diabetes medicines you take, and what your goals are for your blood sugar levels. A registered dietitian or certified diabetes educator can help you plan how many carbs to include in each meal and snack. For most adults, a guideline for the daily amount of carbs is:  · 45 to 60 grams at each meal. That's about the same as 3 to 4 carbohydrate servings. · 15 to 20 grams at each snack. That's about the same as 1 carbohydrate serving. Count carbs  Counting carbs lets you know how much rapid-acting insulin to take before you eat. If you use an insulin pump, you get a constant rate of insulin during the day. So the pump must be programmed at meals. This gives you extra insulin to cover the rise in blood sugar after meals. If you take insulin:  · Learn your own insulin-to-carb ratio. You and your diabetes health professional will figure out the ratio. You can do this by testing your blood sugar after meals. For example, you may need a certain amount of insulin for every 15 grams of carbs. · Add up the carb grams in a meal. Then you can figure out how many units of insulin to take based on your insulin-to-carb ratio. · Exercise lowers blood sugar. You can use less insulin than you would if you were not doing exercise. Keep in mind that timing matters. If you exercise within 1 hour after a meal, your body may need less insulin for that meal than it would if you exercised 3 hours after the meal. Test your blood sugar to find out how exercise affects your need for insulin. If you do or don't take insulin:  · Look at labels on packaged foods. This can tell you how many carbs are in a serving. You can also use guides from the American Diabetes Association. · Be aware of portions, or serving sizes. If a package has two servings and you eat the whole package, you need to double the number of grams of carbohydrate listed for one serving. · Protein, fat, and fiber do not raise blood sugar as much as carbs do. If you eat a lot of these nutrients in a meal, your blood sugar will rise more slowly than it would otherwise. Eat from all food groups  · Eat at least three meals a day. · Plan meals to include food from all the food groups. The food groups include grains, fruits, dairy, proteins, and vegetables.   · Talk to your dietitian or diabetes educator about ways to add limited amounts of sweets into your meal plan. · If you drink alcohol, talk to your doctor. It may not be recommended when you are taking certain diabetes medicines. Where can you learn more? Go to https://IntelligenceBankpepicewStockUp.PinPay. org and sign in to your [employer] account. Enter W690 in the [facility] box to learn more about \"Counting Carbohydrates for Diabetes: Care Instructions. \"     If you do not have an account, please click on the \"Sign Up Now\" link. Current as of: August 31, 2020               Content Version: 12.9  © 0691-9981 Healthwise, Incorporated. Care instructions adapted under license by [facility] ([facility]). If you have questions about a medical condition or this instruction, always ask your healthcare professional. [address] any warranty or liability for your use of this information.

## 2021-08-24 ENCOUNTER — APPOINTMENT (OUTPATIENT)
Dept: CT IMAGING | Age: 46
End: 2021-08-24
Payer: COMMERCIAL

## 2021-08-24 ENCOUNTER — HOSPITAL ENCOUNTER (EMERGENCY)
Age: 46
Discharge: HOME | End: 2021-08-24
Attending: EMERGENCY MEDICINE
Payer: COMMERCIAL

## 2021-08-24 VITALS
TEMPERATURE: 97.9 F | BODY MASS INDEX: 31.83 KG/M2 | WEIGHT: 235 LBS | SYSTOLIC BLOOD PRESSURE: 147 MMHG | HEART RATE: 89 BPM | HEIGHT: 72 IN | RESPIRATION RATE: 16 BRPM | OXYGEN SATURATION: 98 % | DIASTOLIC BLOOD PRESSURE: 97 MMHG

## 2021-08-24 DIAGNOSIS — R91.1 NODULE OF MIDDLE LOBE OF RIGHT LUNG: ICD-10-CM

## 2021-08-24 DIAGNOSIS — R89.9 ABNORMAL LABORATORY TEST: Primary | ICD-10-CM

## 2021-08-24 LAB
A/G RATIO: 1.3 (ref 1.1–2.2)
A/G RATIO: 2 (ref 1.1–2.2)
ALBUMIN SERPL-MCNC: 4 G/DL (ref 3.4–5)
ALBUMIN SERPL-MCNC: 4.5 G/DL (ref 3.4–5)
ALP BLD-CCNC: 102 U/L (ref 40–129)
ALP BLD-CCNC: 103 U/L (ref 40–129)
ALT SERPL-CCNC: 26 U/L (ref 10–40)
ALT SERPL-CCNC: 31 U/L (ref 10–40)
ANION GAP SERPL CALCULATED.3IONS-SCNC: 14 MMOL/L (ref 3–16)
ANION GAP SERPL CALCULATED.3IONS-SCNC: 15 MMOL/L (ref 3–16)
AST SERPL-CCNC: 17 U/L (ref 15–37)
AST SERPL-CCNC: 23 U/L (ref 15–37)
BASOPHILS ABSOLUTE: 0 K/UL (ref 0–0.2)
BASOPHILS RELATIVE PERCENT: 0.4 %
BILIRUB SERPL-MCNC: 0.4 MG/DL (ref 0–1)
BILIRUB SERPL-MCNC: 0.6 MG/DL (ref 0–1)
BUN BLDV-MCNC: 12 MG/DL (ref 7–20)
BUN BLDV-MCNC: 14 MG/DL (ref 7–20)
CALCIUM SERPL-MCNC: 9.5 MG/DL (ref 8.3–10.6)
CALCIUM SERPL-MCNC: 9.7 MG/DL (ref 8.3–10.6)
CHLORIDE BLD-SCNC: 100 MMOL/L (ref 99–110)
CHLORIDE BLD-SCNC: 101 MMOL/L (ref 99–110)
CO2: 20 MMOL/L (ref 21–32)
CO2: 20 MMOL/L (ref 21–32)
CREAT SERPL-MCNC: 0.7 MG/DL (ref 0.9–1.3)
CREAT SERPL-MCNC: <0.5 MG/DL (ref 0.9–1.3)
EOSINOPHILS ABSOLUTE: 0.2 K/UL (ref 0–0.6)
EOSINOPHILS RELATIVE PERCENT: 2.8 %
GFR AFRICAN AMERICAN: >60
GFR AFRICAN AMERICAN: >60
GFR NON-AFRICAN AMERICAN: >60
GFR NON-AFRICAN AMERICAN: >60
GLOBULIN: 2.2 G/DL
GLOBULIN: 3.1 G/DL
GLUCOSE BLD-MCNC: 259 MG/DL (ref 70–99)
GLUCOSE BLD-MCNC: 279 MG/DL (ref 70–99)
HCT VFR BLD CALC: 50.8 % (ref 40.5–52.5)
HEMOGLOBIN: 17.9 G/DL (ref 13.5–17.5)
LIPASE: 140 U/L (ref 13–60)
LYMPHOCYTES ABSOLUTE: 2.5 K/UL (ref 1–5.1)
LYMPHOCYTES RELATIVE PERCENT: 28.1 %
MCH RBC QN AUTO: 31.1 PG (ref 26–34)
MCHC RBC AUTO-ENTMCNC: 35.2 G/DL (ref 31–36)
MCV RBC AUTO: 88.3 FL (ref 80–100)
MONOCYTES ABSOLUTE: 0.6 K/UL (ref 0–1.3)
MONOCYTES RELATIVE PERCENT: 6.6 %
NEUTROPHILS ABSOLUTE: 5.5 K/UL (ref 1.7–7.7)
NEUTROPHILS RELATIVE PERCENT: 62.1 %
PDW BLD-RTO: 12.8 % (ref 12.4–15.4)
PLATELET # BLD: 247 K/UL (ref 135–450)
PMV BLD AUTO: 9.2 FL (ref 5–10.5)
POTASSIUM SERPL-SCNC: 4.4 MMOL/L (ref 3.5–5.1)
POTASSIUM SERPL-SCNC: 5.3 MMOL/L (ref 3.5–5.1)
RBC # BLD: 5.75 M/UL (ref 4.2–5.9)
SODIUM BLD-SCNC: 135 MMOL/L (ref 136–145)
SODIUM BLD-SCNC: 135 MMOL/L (ref 136–145)
TOTAL PROTEIN: 6.7 G/DL (ref 6.4–8.2)
TOTAL PROTEIN: 7.1 G/DL (ref 6.4–8.2)
WBC # BLD: 8.8 K/UL (ref 4–11)

## 2021-08-24 PROCEDURE — 36415 COLL VENOUS BLD VENIPUNCTURE: CPT

## 2021-08-24 PROCEDURE — 80053 COMPREHEN METABOLIC PANEL: CPT

## 2021-08-24 PROCEDURE — 6360000004 HC RX CONTRAST MEDICATION: Performed by: EMERGENCY MEDICINE

## 2021-08-24 PROCEDURE — 83690 ASSAY OF LIPASE: CPT

## 2021-08-24 PROCEDURE — 85025 COMPLETE CBC W/AUTO DIFF WBC: CPT

## 2021-08-24 PROCEDURE — 74177 CT ABD & PELVIS W/CONTRAST: CPT

## 2021-08-24 PROCEDURE — 2580000003 HC RX 258: Performed by: EMERGENCY MEDICINE

## 2021-08-24 PROCEDURE — 99285 EMERGENCY DEPT VISIT HI MDM: CPT

## 2021-08-24 RX ORDER — SODIUM CHLORIDE, SODIUM LACTATE, POTASSIUM CHLORIDE, CALCIUM CHLORIDE 600; 310; 30; 20 MG/100ML; MG/100ML; MG/100ML; MG/100ML
1000 INJECTION, SOLUTION INTRAVENOUS ONCE
Status: COMPLETED | OUTPATIENT
Start: 2021-08-24 | End: 2021-08-24

## 2021-08-24 RX ADMIN — SODIUM CHLORIDE, POTASSIUM CHLORIDE, SODIUM LACTATE AND CALCIUM CHLORIDE 1000 ML: 600; 310; 30; 20 INJECTION, SOLUTION INTRAVENOUS at 17:40

## 2021-08-24 RX ADMIN — IOPAMIDOL 80 ML: 755 INJECTION, SOLUTION INTRAVENOUS at 17:25

## 2021-08-24 ASSESSMENT — PAIN DESCRIPTION - ORIENTATION: ORIENTATION: LEFT;LOWER

## 2021-08-24 ASSESSMENT — PAIN SCALES - GENERAL: PAINLEVEL_OUTOF10: 3

## 2021-08-24 ASSESSMENT — PAIN DESCRIPTION - FREQUENCY
FREQUENCY: INTERMITTENT
FREQUENCY: CONTINUOUS

## 2021-08-24 ASSESSMENT — PAIN DESCRIPTION - DESCRIPTORS: DESCRIPTORS: DULL;CRAMPING

## 2021-08-24 ASSESSMENT — PAIN DESCRIPTION - PAIN TYPE: TYPE: ACUTE PAIN

## 2021-08-24 ASSESSMENT — PAIN DESCRIPTION - LOCATION: LOCATION: ABDOMEN

## 2021-08-24 NOTE — ED NOTES
PA in with patient to discuss CT results. PT will follow up with primary  tomorrow. IV removed from right AC. PT tolerated well. Pt ambulated to exit.       Lieutenant Marizol RN  08/24/21 8028

## 2021-08-24 NOTE — ED PROVIDER NOTES
[address] [facility]          ATTENDING PHYSICIAN NOTE       Date of evaluation: 8/24/2021    Chief Complaint     Abnormal Lab (K 5.3 and glucose 279, sent by doctor. states \"the doctor thinks i have diverticulitits\")      History of Present Illness     Julieta Sierra is a 51 y.o. male who presents because he was told to come to the emergency department for abnormal labs. He had labs drawn yesterday and there was concern for an elevated potassium and glucose. Of note the patient has had about a week of diarrhea that has occasionally had bloody mucus in it. He went to urgent care where he was told he either had diverticulitis or colon cancer and he was started on Cipro and Flagyl which he has been taking. He also just recently established a new primary care physician who started him on glipizide just within the last week. He has not had any nausea or vomiting. He has had some left lower quadrant abdominal pain although this is mild at this time. He denies any other abdominal pain. No fever. Recent Covid test was negative. No other aggravating relieving factors or associated symptoms. His primary reason for being here is regarding the elevated potassium and glucose. Review of Systems     Positive for abdominal pain. Negative for fever, vomiting, chest pain, shortness of breath. All other systems were reviewed and are negative, except as mentioned in HPI. Past Medical, Surgical, Family, and Social History     He has a past medical history of Erectile dysfunction. He has a past surgical history that includes Tonsillectomy and fracture surgery. His family history is not on file. He reports that he has been smoking cigarettes. He has been smoking about 2.50 packs per day. He has never used smokeless tobacco. He reports current alcohol use. He reports current drug use. Drug: Marijuana.     Medications     Discharge Medication List as of 8/24/2021  7:49 PM CONTINUE these medications which have NOT CHANGED    Details   Famotidine (PEPCID AC MAXIMUM STRENGTH PO) Take by mouth 2 times dailyHistorical Med      ciprofloxacin (CIPRO) 500 MG tablet Take 1 tablet by mouth 2 times daily for 10 days, Disp-20 tablet, R-0Normal      metroNIDAZOLE (FLAGYL) 500 MG tablet Take 1 tablet by mouth 3 times daily for 10 days, Disp-30 tablet, R-0Normal      glipiZIDE (GLUCOTROL) 5 MG tablet Take 1 tablet by mouth 2 times daily, Disp-60 tablet, R-1Normal      Blood Glucose Monitoring Suppl KIT DAILY Starting Mon 8/23/2021, Disp-1 kit, R-0, NormalDispense according to insurance formulary      blood glucose test strips (ASCENSIA AUTODISC VI;ONE TOUCH ULTRA TEST VI) strip DAILY Starting Mon 8/23/2021, Disp-100 each, R-2, NormalTest as directed,Dispense according to insurance formulary      Lancets MISC DAILY Starting Mon 8/23/2021, Disp-100 each, R-2, NormalTest as directed, Dispense according to insurance formulary      cetirizine (ZYRTEC) 10 MG tablet Take 10 mg by mouth dailyHistorical Med      Naydllxyl-ZGP-SD-APAP (MADISYN-SELTZER PLUS COLD & FLU PO) Take by mouth as needed Historical Med      metFORMIN (GLUCOPHAGE) 500 MG tablet Take 1 tablet by mouth 2 times daily (with meals), Disp-60 tablet,R-1Normal      fluticasone (FLONASE) 50 MCG/ACT nasal spray 1 spray by Each Nostril route dailyHistorical Med      ibuprofen (ADVIL;MOTRIN) 200 MG tablet Take 600 mg by mouth every 4 hours. Allergies     He is allergic to penicillins. Physical Exam     INITIAL VITALS: BP: (!) 133/91, Temp: 98.4 °F (36.9 °C), Pulse: 72, Resp: 16, SpO2: 97 %       General:  Well appearing. No acute distress. Eyes:  Pupils reactive. No discharge from eyes. ENT:  No discharge from nose. Neck:  Supple. Pulmonary:   Non-labored breathing. Abdomen:  Soft. Non-tender. Non-distended. Musculoskeletal:  No CVA tenderness. Skin:  No rash. Neuro:  Alert and oriented x4.  Moves all four extremities to command. No focal deficit. Extremities:  Warm and perfused. No peripheral edema. Diagnostic Results     LABS:   Please see electronic medical record for laboratory tests performed in the ED. RADIOLOGY:  Please see electronic medical record for imaging studies performed in the ED. RECENT VITALS:  BP: (!) 147/97, Temp: 97.9 °F (36.6 °C), Pulse: 89, Resp: 16     Procedures         ED Course     Nursing Notes, Past Medical Hx, Past Surgical Hx, Social Hx, Allergies, and Family Hx were reviewed. The patient was given the following medications:  Orders Placed This Encounter   Medications    lactated ringers infusion 1,000 mL    iopamidol (ISOVUE-370) 76 % injection 80 mL       CONSULTS:  None    MEDICAL DECISION MAKING / ASSESSMENT / PLAN     Christina Rodriguez is a 51 y.o. male presenting with abnormal labs. Potassium is 4.4 and glucose is 259 today both of which are reassuring. CBC with white count of 8.8 and hemoglobin of 17.9. Renal panel with otherwise normal electrolytes and renal function although bicarb is a little low at 20. LFTs unremarkable. Lipase elevated at 140. The patient was given a liter of fluid for hydration. Declined nausea or pain medication. CT scan of the abdomen and pelvis demonstrated diverticulosis without diverticulitis, fatty infiltration of the liver, lung nodule that will require follow-up CT at 3 months. The patient was discharged home with instructions to discontinue antibiotics given no diverticulitis seen on CT. Incidentaloma instructions given. Follow-up with PCP, return precautions given. Clinical Impression     1. Abnormal laboratory test    2.  Nodule of middle lobe of right lung        Disposition     PATIENT REFERRED TO:  Estela Hawk MD  [address] [place]  [phone]    Schedule an appointment as soon as possible for a visit       [facility] Emergency Department  [address] [facility] [address] [facility]  [phone]    If symptoms worsen      DISCHARGE MEDICATIONS:  Discharge Medication List as of 8/24/2021  7:49 PM          DISPOSITION Decision To Discharge 08/24/2021 07:44:47 Michael Avery MD  08/24/21 2026

## 2021-08-24 NOTE — ED NOTES
Security states that patient has been out walking in the lobby and was seen going outside. Pt does still have IV in place.       Ryne Calle RN  08/24/21 1913

## 2021-08-25 ENCOUNTER — OFFICE VISIT (OUTPATIENT)
Dept: PRIMARY CARE CLINIC | Age: 46
End: 2021-08-25
Payer: COMMERCIAL

## 2021-08-25 VITALS
BODY MASS INDEX: 31.15 KG/M2 | OXYGEN SATURATION: 98 % | DIASTOLIC BLOOD PRESSURE: 80 MMHG | HEIGHT: 72 IN | TEMPERATURE: 98.5 F | HEART RATE: 71 BPM | RESPIRATION RATE: 20 BRPM | WEIGHT: 230 LBS | SYSTOLIC BLOOD PRESSURE: 112 MMHG

## 2021-08-25 DIAGNOSIS — E11.65 UNCONTROLLED TYPE 2 DIABETES MELLITUS WITH HYPERGLYCEMIA (HCC): Primary | ICD-10-CM

## 2021-08-25 DIAGNOSIS — R91.1 LUNG NODULE: ICD-10-CM

## 2021-08-25 DIAGNOSIS — K57.92 DIVERTICULITIS: ICD-10-CM

## 2021-08-25 DIAGNOSIS — Z72.0 TOBACCO ABUSE: ICD-10-CM

## 2021-08-25 PROCEDURE — 2022F DILAT RTA XM EVC RTNOPTHY: CPT | Performed by: FAMILY MEDICINE

## 2021-08-25 PROCEDURE — G8427 DOCREV CUR MEDS BY ELIG CLIN: HCPCS | Performed by: FAMILY MEDICINE

## 2021-08-25 PROCEDURE — G8417 CALC BMI ABV UP PARAM F/U: HCPCS | Performed by: FAMILY MEDICINE

## 2021-08-25 PROCEDURE — 99215 OFFICE O/P EST HI 40 MIN: CPT | Performed by: FAMILY MEDICINE

## 2021-08-25 PROCEDURE — 3046F HEMOGLOBIN A1C LEVEL >9.0%: CPT | Performed by: FAMILY MEDICINE

## 2021-08-25 PROCEDURE — 4004F PT TOBACCO SCREEN RCVD TLK: CPT | Performed by: FAMILY MEDICINE

## 2021-08-25 RX ORDER — GLIPIZIDE 10 MG/1
10 TABLET ORAL 2 TIMES DAILY
Qty: 60 TABLET | Refills: 2 | Status: SHIPPED | OUTPATIENT
Start: 2021-08-25 | End: 2022-01-17

## 2021-08-25 ASSESSMENT — ENCOUNTER SYMPTOMS
SORE THROAT: 0
COUGH: 0
SHORTNESS OF BREATH: 0
NAUSEA: 0
BLOOD IN STOOL: 0
ABDOMINAL PAIN: 1
DIARRHEA: 0
VOMITING: 0

## 2021-08-25 NOTE — PATIENT INSTRUCTIONS
Patient Education      Go to the ER ASAP if you develop any chest pain, shortness of breath, facial droop, slurred speech, weakness/numbness in your arms or legs or double vision! Counting Carbohydrates for Diabetes: Care Instructions  Your Care Instructions     You don't have to eat special foods when you have diabetes. You just have to be careful to eat healthy foods. Carbohydrates (carbs) raise blood sugar higher and quicker than any other nutrient. Carbs are found in desserts, breads and cereals, and fruit. They're also in starchy vegetables. These include potatoes, corn, and grains such as rice and pasta. Carbs are also in milk and yogurt. The more carbs you eat at one time, the higher your blood sugar will rise. Spreading carbs all through the day helps keep your blood sugar levels within your target range. Counting carbs is one of the best ways to keep your blood sugar under control. If you use insulin, counting carbs helps you match the right amount of insulin to the number of grams of carbs in a meal. Then you can change your diet and insulin dose as needed. Testing your blood sugar several times a day can help you learn how carbs affect your blood sugar. A registered dietitian or certified diabetes educator can help you plan meals and snacks. Follow-up care is a key part of your treatment and safety. Be sure to make and go to all appointments, and call your doctor if you are having problems. It's also a good idea to know your test results and keep a list of the medicines you take. How can you care for yourself at home? Know your daily amount of carbohydrates  Your daily amount depends on several things, such as your weight, how active you are, which diabetes medicines you take, and what your goals are for your blood sugar levels. A registered dietitian or certified diabetes educator can help you plan how many carbs to include in each meal and snack.   For most adults, a guideline for the daily amount of carbs is:  · 45 to 60 grams at each meal. That's about the same as 3 to 4 carbohydrate servings. · 15 to 20 grams at each snack. That's about the same as 1 carbohydrate serving. Count carbs  Counting carbs lets you know how much rapid-acting insulin to take before you eat. If you use an insulin pump, you get a constant rate of insulin during the day. So the pump must be programmed at meals. This gives you extra insulin to cover the rise in blood sugar after meals. If you take insulin:  · Learn your own insulin-to-carb ratio. You and your diabetes health professional will figure out the ratio. You can do this by testing your blood sugar after meals. For example, you may need a certain amount of insulin for every 15 grams of carbs. · Add up the carb grams in a meal. Then you can figure out how many units of insulin to take based on your insulin-to-carb ratio. · Exercise lowers blood sugar. You can use less insulin than you would if you were not doing exercise. Keep in mind that timing matters. If you exercise within 1 hour after a meal, your body may need less insulin for that meal than it would if you exercised 3 hours after the meal. Test your blood sugar to find out how exercise affects your need for insulin. If you do or don't take insulin:  · Look at labels on packaged foods. This can tell you how many carbs are in a serving. You can also use guides from the American Diabetes Association. · Be aware of portions, or serving sizes. If a package has two servings and you eat the whole package, you need to double the number of grams of carbohydrate listed for one serving. · Protein, fat, and fiber do not raise blood sugar as much as carbs do. If you eat a lot of these nutrients in a meal, your blood sugar will rise more slowly than it would otherwise. Eat from all food groups  · Eat at least three meals a day. · Plan meals to include food from all the food groups.  The food groups include grains, fruits, dairy, proteins, and vegetables. · Talk to your dietitian or diabetes educator about ways to add limited amounts of sweets into your meal plan. · If you drink alcohol, talk to your doctor. It may not be recommended when you are taking certain diabetes medicines. Where can you learn more? Go to https://chpepiceweb.Flowonix. org and sign in to your [employer] account. Enter K313 in the [employer] box to learn more about \"Counting Carbohydrates for Diabetes: Care Instructions. \"     If you do not have an account, please click on the \"Sign Up Now\" link. Current as of: August 31, 2020               Content Version: 12.9  © 2006-2021 Healthwise, Incorporated. Care instructions adapted under license by [facility] ([facility]). If you have questions about a medical condition or this instruction, always ask your healthcare professional. [address] any warranty or liability for your use of this information.

## 2021-08-25 NOTE — PROGRESS NOTES
icotinr  Chief Complaint   Patient presents with    Other     pt had CT scan done yesterday at [facility] and was informed he had a 10mm nodule in (R) lung         Kadie Em is a 51 y.o. male who presents for ER followup visit. Patient had recent bloodwork was was remarkable for an elevated glucose 279 along with signs of severe dehydration and hyperkalemia with a potassium level 5.3 and patient thus needs to go to the ER ASAP for evaluation and treatment with IV fluids and insulin----- Pt went to the ER and was treated with IV fluids and had a CT scan abdomen/pelvis done which was remarkable for a severe fatty liver and a spiculated solid nodule in the RML lung    Pt has been taking his Rx glipizide medication and reports RBS reading 245 this morning. Pt was told in the ER to stop taking his Rx antibiotics as no diverticulosis was seen on CT scan. Pt states that his diarrhea has resolved    Pt is a smoker of 2.5 PPD but hasn't smoked since his ER visit; positive alcohol use 6 pack beer twice a week; Positive marijuana use nightly for chronic foot burning pain x 4 years     FHx negative for CAD or diabetes or lung cancer      CT scan abdomen/pelvis 08/24/2021  FINDINGS:       : No additional abnormality       LUNG BASES: There is a 10 mm mildly spiculated solid nodule in the right middle lobe on series 601, image 17.       LIVER: The liver shows diffuse fatty infiltration.       SPLEEN: The spleen is normal in size and attenuation.       GALLBLADDER AND BILIARY TREE: No calcified stones are seen. There is no ductal dilatation.        PANCREAS: Pancreas is homogeneous in its enhancement.       ADRENAL GLANDS: Normal.       KIDNEYS AND URETERS: The kidneys concentrate contrast bilaterally. There is no hydronephrosis no focal renal abnormality is visualized.       BOWEL: No oral contrast was given. Stomach is unremarkable. The duodenum is normal in position. Small bowel is normal in caliber. Scattered diverticuli are seen in the sigmoid. There is no inflammation or wall thickening.  The appendix is normal.       PERITONEUM/RETROPERITONEUM: No ascites or free air.       LYMPH NODES: Small lymph nodes are seen in the retroperitoneum. No enlarged lymph nodes are visualized. There are normal-sized lymph nodes at the tao hepatis. No enlarged lymph nodes are seen in the pelvis.       VESSELS: The aorta is normal in caliber. Celiac and SMA are normal.  Portal and hepatic veins are patent. The splenic vein is patent.       REPRODUCTIVE ORGANS: Prostate is mildly prominent.       URINARY BLADDER: Normal.       ABDOMINAL WALL: Normal.       BONES: No significant abnormality.       OTHER FINDINGS: None. No evidence of pancreatitis.       Severe fatty infiltration of the liver.       Multiple small retroperitoneal and pelvic lymph nodes without significant enlargement.       Uncomplicated sigmoid diverticulosis.       Mildly spiculated solid nodule in the right middle lobe. This measures 10 mm and can be evaluated with PET CT or follow-up chest CT in 3 months          Review of Systems   Constitutional: Negative for fatigue and fever. HENT: Negative for nosebleeds and sore throat. Eyes:        Negative blurred vision or diplopia   Respiratory: Negative for cough and shortness of breath. Cardiovascular: Negative for chest pain, palpitations and leg swelling. Gastrointestinal: Positive for abdominal pain (but improving on antibiotics). Negative for blood in stool, diarrhea, nausea and vomiting. Negative melena or indigestion   Endocrine: Negative for polydipsia and polyuria. Genitourinary: Negative for dysuria and hematuria. Musculoskeletal: Negative for arthralgias. Skin: Negative for rash. Neurological: Negative for dizziness, seizures, syncope, speech difficulty, weakness and headaches.         Positive chronic constant burning pain in feet x 4 years   Psychiatric/Behavioral: Negative for dysphoric mood. The patient is not nervous/anxious. Vitals:    08/25/21 0943   BP: 112/80   Pulse: 71   Resp: 20   Temp: 98.5 °F (36.9 °C)   SpO2: 98%         Physical Exam  Vitals reviewed. Constitutional:       General: He is not in acute distress. Appearance: He is obese. HENT:      Mouth/Throat:      Mouth: Mucous membranes are moist.      Pharynx: Oropharynx is clear. Eyes:      General: No scleral icterus. Comments: Pink conjunctivae    Neck:      Thyroid: No thyromegaly. Cardiovascular:      Heart sounds: Normal heart sounds. No murmur heard. No friction rub. No gallop. Pulmonary:      Effort: Pulmonary effort is normal.      Breath sounds: Normal breath sounds. Abdominal:      Palpations: Abdomen is soft. Tenderness: There is abdominal tenderness (mild suprapubic and LLQ areas). There is no guarding or rebound. Musculoskeletal:      Comments: Positive 2 -3+ leg edema but no calf tendernous to palpation noted B/L   Lymphadenopathy:      Cervical: No cervical adenopathy. Skin:     Findings: No rash. Neurological:      Mental Status: He is alert. Comments: Cranial nerves 2 - 12 grossly intact; Muscle strength 5/5 throughout   Psychiatric:         Mood and Affect: Mood normal.         ASSESSMENT AND PLAN    1. Uncontrolled type 2 diabetes mellitus with hyperglycemia (HCC)  Pt clinically improved s/p hydration with IV fluids and will increase his glipizide dosage for better control and patient was told to follow a strict diabetic diet; avoiding concentrated sweets and consuming a low carbohydrate diet and will reevaluate pt in 6 weeks  - glipiZIDE (GLUCOTROL) 10 MG tablet; Take 1 tablet by mouth 2 times daily  Dispense: 60 tablet; Refill: 2    2. Diverticulitis  Pt was told to complete Rx flagyl and cipro antibiotics as directed as he reports resolution of his diarrhea along with improvement of his abdominal pain with treatment. VSS    3.  Tobacco abuse  Patient was advised to Quit Smoking and smoking cessation techniques were discussed with patient (time spent > 3 min)  - nicotine polacrilex (NICORETTE) 4 MG gum; Take 1 each by mouth every 3 hours as needed for Smoking cessation  Dispense: 100 each; Refill: 1    4. Lung nodule  Pt denies any cough or SOB and will Refer pt to Pulmonology for evaluation ASAP  - Keri Moeller MD, Pulmonary, [facility]        Elisa Soto MD      Return in about 6 weeks (around 10/6/2021). Patient Instructions     Patient Education      Go to the ER ASAP if you develop any chest pain, shortness of breath, facial droop, slurred speech, weakness/numbness in your arms or legs or double vision! Counting Carbohydrates for Diabetes: Care Instructions  Your Care Instructions     You don't have to eat special foods when you have diabetes. You just have to be careful to eat healthy foods. Carbohydrates (carbs) raise blood sugar higher and quicker than any other nutrient. Carbs are found in desserts, breads and cereals, and fruit. They're also in starchy vegetables. These include potatoes, corn, and grains such as rice and pasta. Carbs are also in milk and yogurt. The more carbs you eat at one time, the higher your blood sugar will rise. Spreading carbs all through the day helps keep your blood sugar levels within your target range. Counting carbs is one of the best ways to keep your blood sugar under control. If you use insulin, counting carbs helps you match the right amount of insulin to the number of grams of carbs in a meal. Then you can change your diet and insulin dose as needed. Testing your blood sugar several times a day can help you learn how carbs affect your blood sugar. A registered dietitian or certified diabetes educator can help you plan meals and snacks. Follow-up care is a key part of your treatment and safety.  Be sure to make and go to all appointments, and call your doctor if you Diabetes Association. · Be aware of portions, or serving sizes. If a package has two servings and you eat the whole package, you need to double the number of grams of carbohydrate listed for one serving. · Protein, fat, and fiber do not raise blood sugar as much as carbs do. If you eat a lot of these nutrients in a meal, your blood sugar will rise more slowly than it would otherwise. Eat from all food groups  · Eat at least three meals a day. · Plan meals to include food from all the food groups. The food groups include grains, fruits, dairy, proteins, and vegetables. · Talk to your dietitian or diabetes educator about ways to add limited amounts of sweets into your meal plan. · If you drink alcohol, talk to your doctor. It may not be recommended when you are taking certain diabetes medicines. Where can you learn more? Go to https://BoomWriter MediapepicewFanwards.Xtract. org and sign in to your Rapid7 account. Enter S933 in the Vysr box to learn more about \"Counting Carbohydrates for Diabetes: Care Instructions. \"     If you do not have an account, please click on the \"Sign Up Now\" link. Current as of: August 31, 2020               Content Version: 12.9  © 2006-2021 Healthwise, [facility]. Care instructions adapted under license by [facility] ([facility]). If you have questions about a medical condition or this instruction, always ask your healthcare professional. [place] [address] any warranty or liability for your use of this information.

## 2021-09-01 ENCOUNTER — OFFICE VISIT (OUTPATIENT)
Dept: PULMONOLOGY | Age: 46
End: 2021-09-01
Payer: COMMERCIAL

## 2021-09-01 VITALS — HEART RATE: 87 BPM | SYSTOLIC BLOOD PRESSURE: 129 MMHG | DIASTOLIC BLOOD PRESSURE: 80 MMHG | OXYGEN SATURATION: 97 %

## 2021-09-01 DIAGNOSIS — R91.1 PULMONARY NODULE: ICD-10-CM

## 2021-09-01 DIAGNOSIS — F17.200 CURRENT SMOKER: ICD-10-CM

## 2021-09-01 PROCEDURE — G8427 DOCREV CUR MEDS BY ELIG CLIN: HCPCS | Performed by: INTERNAL MEDICINE

## 2021-09-01 PROCEDURE — G8417 CALC BMI ABV UP PARAM F/U: HCPCS | Performed by: INTERNAL MEDICINE

## 2021-09-01 PROCEDURE — 99204 OFFICE O/P NEW MOD 45 MIN: CPT | Performed by: INTERNAL MEDICINE

## 2021-09-01 PROCEDURE — 4004F PT TOBACCO SCREEN RCVD TLK: CPT | Performed by: INTERNAL MEDICINE

## 2021-09-01 ASSESSMENT — ENCOUNTER SYMPTOMS
CONSTIPATION: 0
ABDOMINAL PAIN: 0
SINUS PRESSURE: 0
NAUSEA: 0
DIARRHEA: 0

## 2021-09-01 NOTE — PROGRESS NOTES
[facility]  Consult Note  Jeremiah Chappell MD       Darien Obrien   YOB: 1975    Date of Visit:  9/1/2021    Assessment/Plan:  1. Pulmonary nodule  I reviewed CT imaging. This is a CT of the abdomen and pelvis  Mediastinal structures are not visible and the upper two thirds of the lung fields are not visible. However, he does have a 10 cm spiculated pulmonary nodule in the right lower lobe anterior to the diaphragm. Patient is high risk patient given his smoking habit  Discussed options  Given that current imaging is a CT abdomen pelvis, I favor starting with a PET scan. This will give a CT images of the rest of the chest plus the benefit of pet imaging. Would anticipate that he needs biopsy of this lesion and we discussed that at length today. 2. Current smoker  Patient quit smoking when he heard about the nodule. Committed to remaining complete non-smoker. Chief Complaint   Patient presents with    Abnormal CT     Referred by Dr Jackline Fleischer       HPI  The patient is referred for evaluation of pulmonary nodule. This was discovered when he was having abdominal symptoms. He was sent for a CT scan of the abdomen and pelvis which revealed a spiculated 10 mm right lower lobe pulmonary nodule. He is not having any chest symptoms. He denies shortness of breath, wheezing, cough or sputum. There is no history of hemoptysis, anorexia or weight loss. He does admit to a 2.5 pack/day smoking habit. He quit smoking when he found out about the lung nodule. He denies a family history of lung cancer. He has no personal history of cancer. Review of Systems  Review of Systems   Constitutional: Negative for fatigue and fever. HENT: Negative for congestion and sinus pressure. Eyes: Negative for visual disturbance. Cardiovascular: Negative for chest pain and palpitations.    Gastrointestinal: Negative for abdominal pain, constipation, diarrhea and nausea. Genitourinary: Negative for difficulty urinating. Musculoskeletal: Negative for arthralgias. Skin: Negative for rash. Neurological: Negative for dizziness and light-headedness. Hematological: Does not bruise/bleed easily. Psychiatric/Behavioral: Negative for behavioral problems. History  I have reviewed past medical, surgical, social and family history. This is documented elsewhere in themedical record. Physical Exam:  Physical Exam  Constitutional:       General: He is not in acute distress. Appearance: He is well-developed. Eyes:      General: No scleral icterus. Neck:      Thyroid: No thyromegaly (No other masses noted in the neck. ). Vascular: No JVD. Trachea: No tracheal deviation. Cardiovascular:      Rate and Rhythm: Normal rate and regular rhythm. Heart sounds: Normal heart sounds. No murmur heard. Pulmonary:      Effort: Pulmonary effort is normal. No respiratory distress. Breath sounds: Normal breath sounds. No wheezing or rales. Chest:      Chest wall: No tenderness. Abdominal:      General: There is no distension. Palpations: Abdomen is soft. There is no mass (There is no hepatosplenomegaly. ). Tenderness: There is no abdominal tenderness. Musculoskeletal:      Cervical back: Neck supple. Lymphadenopathy:      Cervical: No cervical adenopathy. Skin:     General: Skin is warm and dry. Neurological:      Mental Status: He is alert and oriented to person, place, and time. Allergies   Allergen Reactions    Penicillins      Prior to Visit Medications    Medication Sig Taking?  Authorizing Provider   glipiZIDE (GLUCOTROL) 10 MG tablet Take 1 tablet by mouth 2 times daily  Estela Hawk MD   nicotine polacrilex (NICORETTE) 4 MG gum Take 1 each by mouth every 3 hours as needed for Smoking cessation  Seferino Delong MD   Famotidine (PEPCID AC MAXIMUM STRENGTH PO) Take by mouth 2 times daily  Historical Provider, MD ciprofloxacin (CIPRO) 500 MG tablet Take 1 tablet by mouth 2 times daily for 10 days  Patient not taking: Reported on 8/25/2021  Rakesh Huggins MD   metroNIDAZOLE (FLAGYL) 500 MG tablet Take 1 tablet by mouth 3 times daily for 10 days  Patient not taking: Reported on 8/25/2021  Rakesh Huggins MD   Blood Glucose Monitoring Suppl KIT 1 kit by Does not apply route daily Dispense according to insurance formulary  Rakesh Huggins MD   blood glucose test strips (ASCENSIA AUTODISC VI;ONE TOUCH ULTRA TEST VI) strip 1 each by In Vitro route daily Test as directed,Dispense according to insurance formulary  Rakesh Huggins MD   Lancets MISC 1 each by Does not apply route daily Test as directed, Dispense according to insurance formulary  Rakesh Huggins MD   cetirizine (ZYRTEC) 10 MG tablet Take 10 mg by mouth daily  Historical Provider, MD   Lttlaungc-YQI-SF-APAP (MADISYN-SELTZER PLUS COLD & FLU PO) Take by mouth as needed   Historical Provider, MD   metFORMIN (GLUCOPHAGE) 500 MG tablet Take 1 tablet by mouth 2 times daily (with meals)  Rakesh Huggins MD   fluticasone (FLONASE) 50 MCG/ACT nasal spray 1 spray by Each Nostril route daily  Historical Provider, MD   ibuprofen (ADVIL;MOTRIN) 200 MG tablet Take 600 mg by mouth every 4 hours. Historical Provider, MD       Vitals:    09/01/21 1553   BP: 129/80   Pulse: 87   SpO2: 97%     There is no height or weight on file to calculate BMI.      Wt Readings from Last 3 Encounters:   08/25/21 230 lb (104.3 kg)   08/24/21 235 lb (106.6 kg)   08/23/21 239 lb (108.4 kg)     BP Readings from Last 3 Encounters:   09/01/21 129/80   08/25/21 112/80   08/24/21 (!) 147/97        Social History     Tobacco Use   Smoking Status Current Every Day Smoker    Packs/day: 2.50    Types: Cigarettes   Smokeless Tobacco Never Used

## 2021-09-13 ENCOUNTER — HOSPITAL ENCOUNTER (OUTPATIENT)
Dept: PET IMAGING | Age: 46
Discharge: HOME | End: 2021-09-13
Payer: COMMERCIAL

## 2021-09-13 DIAGNOSIS — R91.1 LUNG NODULE: ICD-10-CM

## 2021-09-14 ENCOUNTER — TELEPHONE (OUTPATIENT)
Dept: PRIMARY CARE CLINIC | Age: 46
End: 2021-09-14

## 2021-09-14 DIAGNOSIS — Z72.0 TOBACCO ABUSE: Primary | ICD-10-CM

## 2021-09-14 RX ORDER — NICOTINE 21 MG/24HR
1 PATCH, TRANSDERMAL 24 HOURS TRANSDERMAL DAILY
Qty: 14 PATCH | Refills: 5 | Status: SHIPPED | OUTPATIENT
Start: 2021-09-14 | End: 2021-09-28

## 2021-09-14 NOTE — TELEPHONE ENCOUNTER
Patient called to report that when going to complete PET scan they would not run test due to blood sugar levels. Patient reports he has been having a difficult time with levels swinging up and down and the clinician advised him to follow up with his PCP regarding what his next steps should be.

## 2021-09-14 NOTE — TELEPHONE ENCOUNTER
Patient notified and verbalized understanding. Will send My Chart message with blood sugars next week.

## 2021-09-14 NOTE — TELEPHONE ENCOUNTER
Patient was seen 8/25/21, glipizide was increased at that time. Patient also advised to follow strict diabetic diet.

## 2021-09-15 ENCOUNTER — TELEPHONE (OUTPATIENT)
Dept: PULMONOLOGY | Age: 46
End: 2021-09-15

## 2021-09-15 NOTE — TELEPHONE ENCOUNTER
Pt called and said he was scheduled for a PET scan on 9/13 but his blood sugar was too high that day so he was unable to have it done. Needs a new appt scheduled.     Pt's number is # [phone]

## 2021-09-18 ENCOUNTER — PATIENT MESSAGE (OUTPATIENT)
Dept: PRIMARY CARE CLINIC | Age: 46
End: 2021-09-18

## 2021-09-20 ENCOUNTER — TELEPHONE (OUTPATIENT)
Dept: PRIMARY CARE CLINIC | Age: 46
End: 2021-09-20

## 2021-09-20 ENCOUNTER — HOSPITAL ENCOUNTER (OUTPATIENT)
Dept: PET IMAGING | Age: 46
Discharge: HOME | End: 2021-09-20
Payer: COMMERCIAL

## 2021-09-20 DIAGNOSIS — E11.65 UNCONTROLLED TYPE 2 DIABETES MELLITUS WITH HYPERGLYCEMIA (HCC): Primary | ICD-10-CM

## 2021-09-20 DIAGNOSIS — R91.1 PULMONARY NODULE: ICD-10-CM

## 2021-09-20 NOTE — TELEPHONE ENCOUNTER
From: Bin Rowland  To: Karina Hughes MD  Sent: 9/18/2021 10:37 AM EDT  Subject: Non-Urgent Medical Question    Blood sugar Levels:  Tuesday 185  PM  Wednesday 225  PM  Thursday 259  PM  Friday 259  PM  Saturday 221 AM  Friday [facility] for [facility] [employer] I ate nothing all day I didn't get home till 9 PM and when I check my lvl I was still @ 259. So 14 hours of just water and my blood sugar level never changed. I really don't know how I'm going to get my lvl below 200 before Monday, I know starving myself wont get me there so what do I do?

## 2021-09-20 NOTE — TELEPHONE ENCOUNTER
Tell patient to continue taking his glipizide medication and will add januvia 50 mg 1 pill daily to his medicinal regiment (Rx ordered into pharmacy) and patient needs to follow a strict diabetic diet; avoiding concentrated sweets and consuming a low carbohydrate diet

## 2021-09-22 ENCOUNTER — TELEPHONE (OUTPATIENT)
Dept: PRIMARY CARE CLINIC | Age: 46
End: 2021-09-22

## 2021-09-22 DIAGNOSIS — E11.65 UNCONTROLLED TYPE 2 DIABETES MELLITUS WITH HYPERGLYCEMIA (HCC): Primary | ICD-10-CM

## 2021-09-22 NOTE — TELEPHONE ENCOUNTER
Patient needs to be on a DPP-4 inhibitor so call pharmacy and check if they will cover tradjenta 5 mg daily; otherwise need to complete a PreAuth for patient

## 2021-09-22 NOTE — TELEPHONE ENCOUNTER
We received a fax from [facility] stating that the prescription for Januvia was denied by the insurance.   They would like the patient to try Actos and/or Glimepiride before they will cover the Januvia

## 2021-10-06 ENCOUNTER — OFFICE VISIT (OUTPATIENT)
Dept: PRIMARY CARE CLINIC | Age: 46
End: 2021-10-06
Payer: COMMERCIAL

## 2021-10-06 VITALS
TEMPERATURE: 97.2 F | DIASTOLIC BLOOD PRESSURE: 80 MMHG | OXYGEN SATURATION: 98 % | SYSTOLIC BLOOD PRESSURE: 114 MMHG | HEIGHT: 72 IN | HEART RATE: 76 BPM | RESPIRATION RATE: 16 BRPM | WEIGHT: 231 LBS | BODY MASS INDEX: 31.29 KG/M2

## 2021-10-06 DIAGNOSIS — R09.89 LEFT CAROTID BRUIT: ICD-10-CM

## 2021-10-06 DIAGNOSIS — R91.1 LUNG NODULE: ICD-10-CM

## 2021-10-06 DIAGNOSIS — K57.92 DIVERTICULITIS: ICD-10-CM

## 2021-10-06 DIAGNOSIS — E11.65 UNCONTROLLED TYPE 2 DIABETES MELLITUS WITH HYPERGLYCEMIA (HCC): Primary | ICD-10-CM

## 2021-10-06 DIAGNOSIS — Z72.0 TOBACCO ABUSE: ICD-10-CM

## 2021-10-06 PROCEDURE — 3046F HEMOGLOBIN A1C LEVEL >9.0%: CPT | Performed by: FAMILY MEDICINE

## 2021-10-06 PROCEDURE — G8417 CALC BMI ABV UP PARAM F/U: HCPCS | Performed by: FAMILY MEDICINE

## 2021-10-06 PROCEDURE — 4004F PT TOBACCO SCREEN RCVD TLK: CPT | Performed by: FAMILY MEDICINE

## 2021-10-06 PROCEDURE — G8484 FLU IMMUNIZE NO ADMIN: HCPCS | Performed by: FAMILY MEDICINE

## 2021-10-06 PROCEDURE — 99214 OFFICE O/P EST MOD 30 MIN: CPT | Performed by: FAMILY MEDICINE

## 2021-10-06 PROCEDURE — 2022F DILAT RTA XM EVC RTNOPTHY: CPT | Performed by: FAMILY MEDICINE

## 2021-10-06 PROCEDURE — G8427 DOCREV CUR MEDS BY ELIG CLIN: HCPCS | Performed by: FAMILY MEDICINE

## 2021-10-06 ASSESSMENT — ENCOUNTER SYMPTOMS
NAUSEA: 0
SORE THROAT: 0
BLOOD IN STOOL: 0
COUGH: 0
DIARRHEA: 0
ABDOMINAL PAIN: 0
VOMITING: 0
SHORTNESS OF BREATH: 0

## 2021-10-06 NOTE — PATIENT INSTRUCTIONS
Patient Education     Go to the ER ASAP if you develop any chest pain, shortness of breath, facial droop, slurred speech, weakness/numbness in your arms or legs or double vision! Learning About Meal Planning for Diabetes  Why plan your meals? Meal planning can be a key part of managing diabetes. Planning meals and snacks with the right balance of carbohydrate, protein, and fat can help you keep your blood sugar at the target level you set with your doctor. You don't have to eat special foods. You can eat what your family eats, including sweets once in a while. But you do have to pay attention to how often you eat and how much you eat of certain foods. You may want to work with a dietitian or a certified diabetes educator. He or she can give you tips and meal ideas and can answer your questions about meal planning. This health professional can also help you reach a healthy weight if that is one of your goals. What plan is right for you? Your dietitian or diabetes educator may suggest that you start with the plate format or carbohydrate counting. The plate format  The plate format is a simple way to help you manage how you eat. You plan meals by learning how much space each food should take on a plate. Using the plate format helps you spread carbohydrate throughout the day. It can make it easier to keep your blood sugar level within your target range. It also helps you see if you're eating healthy portion sizes. To use the plate format, you put non-starchy vegetables on half your plate. Add meat or meat substitutes on one-quarter of the plate. Put a grain or starchy vegetable (such as brown rice or a potato) on the final quarter of the plate. You can add a small piece of fruit and some low-fat or fat-free milk or yogurt, depending on your carbohydrate goal for each meal.  Here are some tips for using the plate format:  · Make sure that you are not using an oversized plate.  A 9-inch plate is best. Many restaurants use larger plates. · Get used to using the plate format at home. Then you can use it when you eat out. · Write down your questions about using the plate format. Talk to your doctor, a dietitian, or a diabetes educator about your concerns. Carbohydrate counting  With carbohydrate counting, you plan meals based on the amount of carbohydrate in each food. Carbohydrate raises blood sugar higher and more quickly than any other nutrient. It is found in desserts, breads and cereals, and fruit. It's also found in starchy vegetables such as potatoes and corn, grains such as rice and pasta, and milk and yogurt. Spreading carbohydrate throughout the day helps keep your blood sugar levels within your target range. Your daily amount depends on several things, including your weight, how active you are, which diabetes medicines you take, and what your goals are for your blood sugar levels. A registered dietitian or diabetes educator can help you plan how much carbohydrate to include in each meal and snack. A guideline for your daily amount of carbohydrate is:  · 45 to 60 grams at each meal. That's about the same as 3 to 4 carbohydrate servings. · 15 to 20 grams at each snack. That's about the same as 1 carbohydrate serving. The Nutrition Facts label on packaged foods tells you how much carbohydrate is in a serving of the food. First, look at the serving size on the food label. Is that the amount you eat in a serving? All of the nutrition information on a food label is based on that serving size. So if you eat more or less than that, you'll need to adjust the other numbers. Total carbohydrate is the next thing you need to look for on the label. If you count carbohydrate servings, one serving of carbohydrate is 15 grams. For foods that don't come with labels, such as fresh fruits and vegetables, you'll need a guide that lists carbohydrate in these foods.  Ask your doctor, dietitian, or diabetes educator about books or other nutrition guides you can use. If you take insulin, you need to know how many grams of carbohydrate are in a meal. This lets you know how much rapid-acting insulin to take before you eat. If you use an insulin pump, you get a constant rate of insulin during the day. So the pump must be programmed at meals to give you extra insulin to cover the rise in blood sugar after meals. When you know how much carbohydrate you will eat, you can take the right amount of insulin. Or, if you always use the same amount of insulin, you need to make sure that you eat the same amount of carbohydrate at meals. If you need more help to understand carbohydrate counting and food labels, ask your doctor, dietitian, or diabetes educator. How can you plan healthy meals? Here are some tips to get started:  · Plan your meals a week at a time. Don't forget to include snacks too. · Use cookbooks or online recipes to plan several main meals. Plan some quick meals for busy nights. You also can double some recipes that freeze well. Then you can save half for other busy nights when you don't have time to cook. · Make sure you have the ingredients you need for your recipes. If you're running low on basic items, put these items on your shopping list too. · List foods that you use to make breakfasts, lunches, and snacks. List plenty of fruits and vegetables. · Post this list on the refrigerator. Add to it as you think of more things you need. · Take the list to the store to do your weekly shopping. Follow-up care is a key part of your treatment and safety. Be sure to make and go to all appointments, and call your doctor if you are having problems. It's also a good idea to know your test results and keep a list of the medicines you take. Where can you learn more? Go to https://kaila.iConnect CRM. org and sign in to your MoBank account.  Enter S848 in the PrivateGriffe box to learn more about \"Learning About Meal Planning for Diabetes. \"     If you do not have an account, please click on the \"Sign Up Now\" link. Current as of: December 17, 2020               Content Version: 13.0  © 2006-2021 Healthwise, Incorporated. Care instructions adapted under license by [facility] ([facility]). If you have questions about a medical condition or this instruction, always ask your healthcare professional. [place] [address] any warranty or liability for your use of this information.

## 2021-10-06 NOTE — PROGRESS NOTES
Chief Complaint   Patient presents with    Diabetes     follow up visit - pt will  new meds this week. Still hasn't been able to get PET scan         Malika Lafleur is a 51 y.o. male who presents for followup visiit regarding diabetes. Pt did not get Rx tradjenta medication yet secondary to insurance issues. Pt has been taking his Rx glipizide medication and reports RBS readings between 180 - 250    Pt did see Pulmonologist as directed regarding his spiculated solid nodule in the RML lung and was ordered a PET scan but has been unable to get this procedure done secondary to his elevated blood sugars    Pt did complete his course of antibiotics for diverticulitis      Pt is a smoker of 2.5 PPD but has been tobacco free x 2 months; positive alcohol use 6 pack beer twice a week but has not been drinking alcohol over the past 2 months; Positive marijuana use nightly for chronic foot burning pain x 4 years     FHx negative for CAD or diabetes or lung cancer       Pulmonology note 09/01/2021  Assessment/Plan:  1. Pulmonary nodule  I reviewed CT imaging. This is a CT of the abdomen and pelvis  Mediastinal structures are not visible and the upper two thirds of the lung fields are not visible. However, he does have a 10 cm spiculated pulmonary nodule in the right lower lobe anterior to the diaphragm.     Patient is high risk patient given his smoking habit  Discussed options  Given that current imaging is a CT abdomen pelvis, I favor starting with a PET scan. This will give a CT images of the rest of the chest plus the benefit of pet imaging. Would anticipate that he needs biopsy of this lesion and we discussed that at length today.     2. Current smoker  Patient quit smoking when he heard about the nodule. Committed to remaining complete non-smoker         Review of Systems   Constitutional: Negative for fatigue and fever. Negative weight loss   HENT: Negative for nosebleeds and sore throat. Eyes:        Negative blurred vision or diplopia   Respiratory: Negative for cough and shortness of breath. Cardiovascular: Negative for chest pain, palpitations and leg swelling. Gastrointestinal: Negative for abdominal pain, blood in stool, diarrhea, nausea and vomiting. Negative melena or indigestion   Endocrine: Negative for polydipsia and polyuria. Genitourinary: Negative for dysuria and hematuria. Musculoskeletal: Negative for arthralgias. Skin: Negative for rash. Neurological: Negative for dizziness, seizures, syncope, speech difficulty, weakness and headaches. Positive chronic constant burning pain in feet x 4 years   Psychiatric/Behavioral: Negative for dysphoric mood. The patient is not nervous/anxious. Vitals:    10/06/21 0902   BP: 114/80   Pulse: 76   Resp: 16   Temp: 97.2 °F (36.2 °C)   SpO2: 98%         Physical Exam  Vitals reviewed. Constitutional:       General: He is not in acute distress. Appearance: He is obese. HENT:      Mouth/Throat:      Mouth: Mucous membranes are moist.      Pharynx: Oropharynx is clear. Eyes:      General: No scleral icterus. Comments: Pink conjunctivae    Neck:      Thyroid: No thyromegaly. Comments: Positive L carotid bruit noted  Cardiovascular:      Heart sounds: Normal heart sounds. No murmur heard. No friction rub. No gallop. Pulmonary:      Effort: Pulmonary effort is normal.      Breath sounds: Normal breath sounds. Abdominal:      Palpations: Abdomen is soft. Tenderness: There is no abdominal tenderness. Musculoskeletal:      Comments: Positive 2 -3+ leg edema but no calf tendernous to palpation noted B/L   Lymphadenopathy:      Cervical: No cervical adenopathy. Skin:     Findings: No rash. Neurological:      Mental Status: He is alert.       Comments: Cranial nerves 2 - 12 grossly intact; Muscle strength 5/5 throughout   Psychiatric:         Mood and Affect: Mood normal.         ASSESSMENT AND PLAN    1. Uncontrolled type 2 diabetes mellitus with hyperglycemia ([facility] [address]  Pt was told to start taking Rx tradjenta medication as directed for better control and needs to follow a strict diabetic diet; avoiding concentrated sweets and consuming a low carbohydrate diet and will reevaluate pt in 6 weeks  - US CAROTID ARTERY BILATERAL; Future    2. Diverticulitis  Pt clinically improved s/p treatment with antibiotics and is with a nontender abdomen on PE    3. Lung nodule  Pt was evaluated by Pulmonology and plan is for a PET scan and nodule biopsy for further evaluation. Pt is with clear lung fields on PE    4. Tobacco abuse  Pt has been tobacco free x 2 months using nicotine gum    5. Left carotid bruit  Pt is with a nonfocal neuro exam and stable VS and will check carotid dopplers for further evaluation  - US CAROTID ARTERY BILATERAL; Future        Mukesh Pearson MD      Return in about 6 weeks (around 11/17/2021). Patient Instructions     Patient Education     Go to the ER ASAP if you develop any chest pain, shortness of breath, facial droop, slurred speech, weakness/numbness in your arms or legs or double vision! Learning About Meal Planning for Diabetes  Why plan your meals? Meal planning can be a key part of managing diabetes. Planning meals and snacks with the right balance of carbohydrate, protein, and fat can help you keep your blood sugar at the target level you set with your doctor. You don't have to eat special foods. You can eat what your family eats, including sweets once in a while. But you do have to pay attention to how often you eat and how much you eat of certain foods. You may want to work with a dietitian or a certified diabetes educator. He or she can give you tips and meal ideas and can answer your questions about meal planning. This health professional can also help you reach a healthy weight if that is one of your goals. What plan is right for you?   Your dietitian or diabetes educator may suggest that you start with the plate format or carbohydrate counting. The plate format  The plate format is a simple way to help you manage how you eat. You plan meals by learning how much space each food should take on a plate. Using the plate format helps you spread carbohydrate throughout the day. It can make it easier to keep your blood sugar level within your target range. It also helps you see if you're eating healthy portion sizes. To use the plate format, you put non-starchy vegetables on half your plate. Add meat or meat substitutes on one-quarter of the plate. Put a grain or starchy vegetable (such as brown rice or a potato) on the final quarter of the plate. You can add a small piece of fruit and some low-fat or fat-free milk or yogurt, depending on your carbohydrate goal for each meal.  Here are some tips for using the plate format:  · Make sure that you are not using an oversized plate. A 9-inch plate is best. Many restaurants use larger plates. · Get used to using the plate format at home. Then you can use it when you eat out. · Write down your questions about using the plate format. Talk to your doctor, a dietitian, or a diabetes educator about your concerns. Carbohydrate counting  With carbohydrate counting, you plan meals based on the amount of carbohydrate in each food. Carbohydrate raises blood sugar higher and more quickly than any other nutrient. It is found in desserts, breads and cereals, and fruit. It's also found in starchy vegetables such as potatoes and corn, grains such as rice and pasta, and milk and yogurt. Spreading carbohydrate throughout the day helps keep your blood sugar levels within your target range. Your daily amount depends on several things, including your weight, how active you are, which diabetes medicines you take, and what your goals are for your blood sugar levels.  A registered dietitian or diabetes educator can help you plan how much carbohydrate to include in each meal and snack. A guideline for your daily amount of carbohydrate is:  · 45 to 60 grams at each meal. That's about the same as 3 to 4 carbohydrate servings. · 15 to 20 grams at each snack. That's about the same as 1 carbohydrate serving. The Nutrition Facts label on packaged foods tells you how much carbohydrate is in a serving of the food. First, look at the serving size on the food label. Is that the amount you eat in a serving? All of the nutrition information on a food label is based on that serving size. So if you eat more or less than that, you'll need to adjust the other numbers. Total carbohydrate is the next thing you need to look for on the label. If you count carbohydrate servings, one serving of carbohydrate is 15 grams. For foods that don't come with labels, such as fresh fruits and vegetables, you'll need a guide that lists carbohydrate in these foods. Ask your doctor, dietitian, or diabetes educator about books or other nutrition guides you can use. If you take insulin, you need to know how many grams of carbohydrate are in a meal. This lets you know how much rapid-acting insulin to take before you eat. If you use an insulin pump, you get a constant rate of insulin during the day. So the pump must be programmed at meals to give you extra insulin to cover the rise in blood sugar after meals. When you know how much carbohydrate you will eat, you can take the right amount of insulin. Or, if you always use the same amount of insulin, you need to make sure that you eat the same amount of carbohydrate at meals. If you need more help to understand carbohydrate counting and food labels, ask your doctor, dietitian, or diabetes educator. How can you plan healthy meals? Here are some tips to get started:  · Plan your meals a week at a time. Don't forget to include snacks too. · Use cookbooks or online recipes to plan several main meals.  Plan some quick meals for busy nights. You also can double some recipes that freeze well. Then you can save half for other busy nights when you don't have time to cook. · Make sure you have the ingredients you need for your recipes. If you're running low on basic items, put these items on your shopping list too. · List foods that you use to make breakfasts, lunches, and snacks. List plenty of fruits and vegetables. · Post this list on the refrigerator. Add to it as you think of more things you need. · Take the list to the store to do your weekly shopping. Follow-up care is a key part of your treatment and safety. Be sure to make and go to all appointments, and call your doctor if you are having problems. It's also a good idea to know your test results and keep a list of the medicines you take. Where can you learn more? Go to [email or web address] org and sign in to your [employer] account. Enter V234 in the Dine in box to learn more about \"Learning About Meal Planning for Diabetes. \"     If you do not have an account, please click on the \"Sign Up Now\" link. Current as of: December 17, 2020               Content Version: 13.0  © 2006-2021 [employer]. Care instructions adapted under license by [facility] ([facility]). If you have questions about a medical condition or this instruction, always ask your healthcare professional. [place] [address] any warranty or liability for your use of this information.

## 2021-10-18 ENCOUNTER — HOSPITAL ENCOUNTER (OUTPATIENT)
Dept: VASCULAR LAB | Age: 46
Discharge: HOME | End: 2021-10-18
Payer: COMMERCIAL

## 2021-10-18 ENCOUNTER — HOSPITAL ENCOUNTER (OUTPATIENT)
Age: 46
Discharge: HOME | End: 2021-10-18
Payer: COMMERCIAL

## 2021-10-18 DIAGNOSIS — E11.65 UNCONTROLLED TYPE 2 DIABETES MELLITUS WITH HYPERGLYCEMIA (HCC): ICD-10-CM

## 2021-10-18 DIAGNOSIS — R09.89 LEFT CAROTID BRUIT: ICD-10-CM

## 2021-10-18 PROCEDURE — 93880 EXTRACRANIAL BILAT STUDY: CPT

## 2021-10-19 NOTE — TELEPHONE ENCOUNTER
Ross Villaseñor from PET scan scheduling called and wanted to speak to Jessenia regarding pt.    [phone]

## 2021-10-19 NOTE — TELEPHONE ENCOUNTER
Spoke with pt He cannot get BS down in the mornings so needs to have PET Scan in the afternoons He showed up yesterday at 5:00 and was told he missed his PET Scan as it was scheduled for 8 am and he knows it wasn't because he needs all day to get his BS's down. He stated he would call and reschedule Told him to ask for [facility]. He stated he would. Called [facility] and passed this along.  She went ahead and put pt on for next Monday at 6:00 and will relay this info to him when he contacts her

## 2021-10-19 NOTE — TELEPHONE ENCOUNTER
Called and spoke with Gerson Bhakta. They have tried 6 times to get pt a PET Scan. He has ate prior to one, BS's were too high x 2 no show yesterday and there was one time that the Scanner was down so wasn't his fault on that one. Just wanted us to be aware. Tried to call pt to see what was going on and his mailbox was full Will try again later.

## 2021-10-25 ENCOUNTER — HOSPITAL ENCOUNTER (OUTPATIENT)
Dept: PET IMAGING | Age: 46
Discharge: HOME | End: 2021-10-25
Payer: COMMERCIAL

## 2021-10-25 DIAGNOSIS — R91.1 LUNG NODULE: ICD-10-CM

## 2021-12-17 ENCOUNTER — TELEPHONE (OUTPATIENT)
Dept: PULMONOLOGY | Age: 46
End: 2021-12-17

## 2021-12-17 NOTE — TELEPHONE ENCOUNTER
Patient called to inform us that he is rescheduled for his PET scan on 21. The prior auth  21 with [facility]. He is requesting to have prior authorization update.

## 2021-12-23 NOTE — TELEPHONE ENCOUNTER
[phone] informed that PA was resent to [facility] 12-17-21 but we have not heard back from them

## 2021-12-30 NOTE — TELEPHONE ENCOUNTER
Attempted to call [facility] at 3:50pm.  Recording said normal business hours were 8am-7pm but then it said they were closed. Unable to check prior auth status.

## 2021-12-30 NOTE — TELEPHONE ENCOUNTER
Spoke with Bettye at PET. She states PET scan was rescheduled to 01/10/22 at 3pm.  Will f/u with [facility] re: Nikolas Diaz.

## 2021-12-30 NOTE — TELEPHONE ENCOUNTER
Trinidad Campbell calling to see if we can check with Stacy Maxwell for status of PA.   Patient is schedule to have his PET CT on 1/3/22.  [phone]

## 2022-01-06 ENCOUNTER — TELEPHONE (OUTPATIENT)
Dept: PRIMARY CARE CLINIC | Age: 47
End: 2022-01-06

## 2022-01-06 NOTE — TELEPHONE ENCOUNTER
Patient called to request blood sugar medication refilled-- states that despite taking as prescribed his blood sugar numbers have not improved, he states he can not complete PET scan at this time due to blood sugar levels. Please review and advise.

## 2022-01-07 ENCOUNTER — OFFICE VISIT (OUTPATIENT)
Dept: PRIMARY CARE CLINIC | Age: 47
End: 2022-01-07
Payer: COMMERCIAL

## 2022-01-07 VITALS
RESPIRATION RATE: 20 BRPM | TEMPERATURE: 97.1 F | DIASTOLIC BLOOD PRESSURE: 82 MMHG | SYSTOLIC BLOOD PRESSURE: 126 MMHG | HEIGHT: 72 IN | BODY MASS INDEX: 32.23 KG/M2 | WEIGHT: 238 LBS | OXYGEN SATURATION: 98 % | HEART RATE: 79 BPM

## 2022-01-07 DIAGNOSIS — Z72.0 TOBACCO ABUSE: ICD-10-CM

## 2022-01-07 DIAGNOSIS — E11.65 UNCONTROLLED TYPE 2 DIABETES MELLITUS WITH HYPERGLYCEMIA (HCC): Primary | ICD-10-CM

## 2022-01-07 LAB
CHP ED QC CHECK: ABNORMAL
GLUCOSE BLD-MCNC: 241 MG/DL

## 2022-01-07 PROCEDURE — 4004F PT TOBACCO SCREEN RCVD TLK: CPT | Performed by: STUDENT IN AN ORGANIZED HEALTH CARE EDUCATION/TRAINING PROGRAM

## 2022-01-07 PROCEDURE — 3046F HEMOGLOBIN A1C LEVEL >9.0%: CPT | Performed by: STUDENT IN AN ORGANIZED HEALTH CARE EDUCATION/TRAINING PROGRAM

## 2022-01-07 PROCEDURE — G8427 DOCREV CUR MEDS BY ELIG CLIN: HCPCS | Performed by: STUDENT IN AN ORGANIZED HEALTH CARE EDUCATION/TRAINING PROGRAM

## 2022-01-07 PROCEDURE — G8417 CALC BMI ABV UP PARAM F/U: HCPCS | Performed by: STUDENT IN AN ORGANIZED HEALTH CARE EDUCATION/TRAINING PROGRAM

## 2022-01-07 PROCEDURE — 99214 OFFICE O/P EST MOD 30 MIN: CPT | Performed by: STUDENT IN AN ORGANIZED HEALTH CARE EDUCATION/TRAINING PROGRAM

## 2022-01-07 PROCEDURE — G8482 FLU IMMUNIZE ORDER/ADMIN: HCPCS | Performed by: STUDENT IN AN ORGANIZED HEALTH CARE EDUCATION/TRAINING PROGRAM

## 2022-01-07 PROCEDURE — 2022F DILAT RTA XM EVC RTNOPTHY: CPT | Performed by: STUDENT IN AN ORGANIZED HEALTH CARE EDUCATION/TRAINING PROGRAM

## 2022-01-07 PROCEDURE — 82962 GLUCOSE BLOOD TEST: CPT | Performed by: STUDENT IN AN ORGANIZED HEALTH CARE EDUCATION/TRAINING PROGRAM

## 2022-01-07 RX ORDER — GLIMEPIRIDE 4 MG/1
4 TABLET ORAL
Qty: 30 TABLET | Refills: 2 | Status: SHIPPED | OUTPATIENT
Start: 2022-01-07 | End: 2022-01-17 | Stop reason: SDUPTHER

## 2022-01-07 RX ORDER — INSULIN GLARGINE 100 [IU]/ML
10 INJECTION, SOLUTION SUBCUTANEOUS NIGHTLY
Qty: 5 PEN | Refills: 3 | Status: SHIPPED | OUTPATIENT
Start: 2022-01-07 | End: 2022-03-07 | Stop reason: SDUPTHER

## 2022-01-07 NOTE — PROGRESS NOTES
Patient:  Marivel Nelson 51 y.o. male     Date of Service: 1/7/2022       Chief complaint:   Chief Complaint   Patient presents with    Diabetes     c/o continued elevated glucose       History of Present Illness   Diabetes: Patient presents for diabetes follow-up. Patient called yesterday reporting that his sugars have been high despite taking his medications. He is on Tradjenta 5 mg daily and glipizide 10 mg twice daily. His last A1c was 9.8. POCT glucose today is 241. Patient reports that he stopped taking Danni Adriel because he did not see results with it and it was expensive. 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 So he only eats out, however at home they cook and he tries to adhere to low carb diet. He is not on insulin and he was informed by PCP in the past that insulin will be last result. He is now willing to try insulin. He has tried Metformin in the past but not agreeable with him. Tobacco abuse: Patient is working on quitting smoking and reports that Nicorette helps him, however he is requesting for prescription for refill with the specific flavor. [employer]:   Review of Systems   Constitutional: Negative for fever. Respiratory: Negative for shortness of breath and wheezing. Cardiovascular: Negative for chest pain and palpitations. Gastrointestinal: Negative for abdominal pain. Physical Exam   Vitals: /82   Pulse 79   Temp 97.1 °F (36.2 °C)   Resp 20   Ht 6' (1.829 m)   Wt 238 lb (108 kg)   SpO2 98%   BMI 32.28 kg/m²   Physical Exam  Constitutional:       Appearance: Normal appearance. Cardiovascular:      Rate and Rhythm: Normal rate and regular rhythm. Pulses: Normal pulses. Heart sounds: Normal heart sounds. Pulmonary:      Effort: Pulmonary effort is normal.      Breath sounds: Normal breath sounds. Neurological:      Mental Status: He is alert and oriented to person, place, and time.    Psychiatric: Mood and Affect: Mood normal.         Behavior: Behavior normal.            Results for POC orders placed in visit on 01/07/22   POCT Glucose   Result Value Ref Range    Glucose 241 mg/dL    QC OK? Assessment and Plan   1. Uncontrolled type 2 diabetes mellitus with hyperglycemia (HCC)  -     POCT Glucose  -     insulin glargine (BASAGLAR KWIKPEN) 100 UNIT/ML injection pen; Inject 10 Units into the skin nightly, Disp-5 pen, R-3Normal  -     glimepiride (AMARYL) 4 MG tablet; Take 1 tablet by mouth every morning (before breakfast), Disp-30 tablet, R-2Normal  2. Tobacco abuse  -     nicotine polacrilex (NICORETTE) 4 MG gum; Take 1 each by mouth as needed for Smoking cessation, Disp-160 each, R-3Coated fruit wave gum, per patient's preferenceNormal  Patient's diabetes is still uncontrolled. She is agreeable to try 10 units of of long-acting insulin. We are going to switch glipizide to glimepiride (due to glipizide having higher chance of hypoglycemic episodes). Patient was instructed about checking his blood sugars regularly and reporting to us if he experiences hypoglycemic episodes. For long-term we will also consider Jo Ann Phan, pending insurance coverage. Issues to address at future visit/s:     Return to Office: Return in about 2 weeks (around 1/21/2022) for diabetes follow up.     Medication List:    Current Outpatient Medications   Medication Sig Dispense Refill    nicotine polacrilex (NICORETTE) 4 MG gum Take 1 each by mouth as needed for Smoking cessation 160 each 3    insulin glargine (BASAGLAR KWIKPEN) 100 UNIT/ML injection pen Inject 10 Units into the skin nightly 5 pen 3    glimepiride (AMARYL) 4 MG tablet Take 1 tablet by mouth every morning (before breakfast) 30 tablet 2    linagliptin (TRADJENTA) 5 MG tablet Take 1 tablet by mouth daily 30 tablet 1    glipiZIDE (GLUCOTROL) 10 MG tablet Take 1 tablet by mouth 2 times daily 60 tablet 2    Famotidine (PEPCID AC MAXIMUM STRENGTH PO) Take by

## 2022-01-07 NOTE — PATIENT INSTRUCTIONS
Patient Education        Learning About Carbohydrate (Carb) Counting and Eating Out When You Have Diabetes  Why plan your meals? Meal planning can be a key part of managing diabetes. Planning meals and snacks with the right balance of carbohydrate, protein, and fat can help you keep your blood sugar at the target level you set with your doctor. You don't have to eat special foods. You can eat what your family eats, including sweets once in a while. But you do have to pay attention to how often you eat and how much you eat of certain foods. You may want to work with a dietitian or a certified diabetes educator. He or she can give you tips and meal ideas and can answer your questions about meal planning. This health professional can also help you reach a healthy weight if that is one of your goals. What should you know about eating carbs? Managing the amount of carbohydrate (carbs) you eat is an important part of healthy meals when you have diabetes. Carbohydrate is found in many foods. · Learn which foods have carbs. And learn the amounts of carbs in different foods. ? Bread, cereal, pasta, and rice have about 15 grams of carbs in a serving. A serving is 1 slice of bread (1 ounce), ½ cup of cooked cereal, or 1/3 cup of cooked pasta or rice. ? Fruits have 15 grams of carbs in a serving. A serving is 1 small fresh fruit, such as an apple or orange; ½ of a banana; ½ cup of cooked or canned fruit; ½ cup of fruit juice; 1 cup of melon or raspberries; or 2 tablespoons of dried fruit. ? Milk and no-sugar-added yogurt have 15 grams of carbs in a serving. A serving is 1 cup of milk or 2/3 cup of no-sugar-added yogurt. ? Starchy vegetables have 15 grams of carbs in a serving. A serving is ½ cup of mashed potatoes or sweet potato; 1 cup winter squash; ½ of a small baked potato; ½ cup of cooked beans; or ½ cup cooked corn or green peas.   · Learn how much carbs to eat each day and at each meal. A dietitian or CDE can teach you how to keep track of the amount of carbs you eat. This is called carbohydrate counting. · If you are not sure how to count carbohydrate grams, use the Plate Method to plan meals. It is a good, quick way to make sure that you have a balanced meal. It also helps you spread carbs throughout the day. ? Divide your plate by types of foods. Put non-starchy vegetables on half the plate, meat or other protein food on one-quarter of the plate, and a grain or starchy vegetable in the final quarter of the plate. To this you can add a small piece of fruit and 1 cup of milk or yogurt, depending on how many carbs you are supposed to eat at a meal.  · Try to eat about the same amount of carbs at each meal. Do not \"save up\" your daily allowance of carbs to eat at one meal.  · Proteins have very little or no carbs per serving. Examples of proteins are beef, chicken, turkey, fish, eggs, tofu, cheese, cottage cheese, and peanut butter. A serving size of meat is 3 ounces, which is about the size of a deck of cards. Examples of meat substitute serving sizes (equal to 1 ounce of meat) are 1/4 cup of cottage cheese, 1 egg, 1 tablespoon of peanut butter, and ½ cup of tofu. How can you eat out and still eat healthy? · Learn to estimate the serving sizes of foods that have carbohydrate. If you measure food at home, it will be easier to estimate the amount in a serving of restaurant food. · If the meal you order has too much carbohydrate (such as potatoes, corn, or baked beans), ask to have a low-carbohydrate food instead. Ask for a salad or green vegetables. · If you use insulin, check your blood sugar before and after eating out to help you plan how much to eat in the future. · If you eat more carbohydrate at a meal than you had planned, take a walk or do other exercise. This will help lower your blood sugar. What are some tips for eating healthy? · Limit saturated fat, such as the fat from meat and dairy products.  This is a healthy choice because people who have diabetes are at higher risk of heart disease. So choose lean cuts of meat and nonfat or low-fat dairy products. Use olive or canola oil instead of butter or shortening when cooking. · Don't skip meals. Your blood sugar may drop too low if you skip meals and take insulin or certain medicines for diabetes. · Check with your doctor before you drink alcohol. Alcohol can cause your blood sugar to drop too low. Alcohol can also cause a bad reaction if you take certain diabetes medicines. Follow-up care is a key part of your treatment and safety. Be sure to make and go to all appointments, and call your doctor if you are having problems. It's also a good idea to know your test results and keep a list of the medicines you take. Where can you learn more? Go to https://Kiiantonioeb.MODLOFT. org and sign in to your Talkpush account. Enter A566 in the Knock Knock box to learn more about \"Learning About Carbohydrate (Carb) Counting and Eating Out When You Have Diabetes. \"     If you do not have an account, please click on the \"Sign Up Now\" link. Current as of: September 8, 2021               Content Version: 13.1  © 2977-6775 Healthwise, [employer]. Care instructions adapted under license by [facility] ([facility] [place]). If you have questions about a medical condition or this instruction, always ask your healthcare professional. [place] [address] any warranty or liability for your use of this information. Patient Education        Learning About How to Use an Insulin Pen  What is an insulin pen? An insulin pen is a device for giving insulin shots. It looks like a pen. Both disposable and reusable insulin pens are available. For a reusable pen, you put the insulin cartridge into the pen. Disposable pens already have an insulin cartridge. You can set the dose of insulin with a dial on the outside of the pen.  You use the pen to give the insulin shot (injection). Insulin pens can be used instead of bottles and syringes. Before their first use, insulin pens or insulin cartridges are stored in the refrigerator. After that, you can store them at room temperature. In general, they  within a month after you open them. Follow the directions for storing and using the insulin. How to use an insulin pen  Know which type of insulin pen you're using. Insulin pens are either reusable or disposable. For a reusable pen, you put the insulin cartridge into the pen. Disposable pens already have an insulin cartridge. Before using cloudy insulin, such as NPH and premixed insulin, gently roll the pen between your palms 10 times. Then tip the pen up and down 10 times. Do not shake the pen. The insulin should look milky white. Attach the needle to the insulin pen. Follow the directions for how to screw a new needle onto your pen. Remove the outer cap from the needle. Keep this outer cap. You will use it later to safely dispose of the needle. Remove the inner cover from the needle. Be careful not to prick yourself. Prime the needle. Before each shot, prime the needle. Priming removes air from the needle and helps make sure you're getting the right dose. Turn the dose knob to 2 units or to the amount that your pen's  recommends. Hold your pen with the needle pointing up. Tap the cartridge wyman gently to move any air bubbles to the top. Push the injection button all the way in. Watch for a stream or drop of insulin to come out of the needle. If it doesn't, repeat this step. Pick a spot. Clean the area of skin where you will give the shot. If you use alcohol to clean the skin before you give the injection, let it dry. Use a different spot each time you inject insulin. Using the same spot every time can cause bumps or pits to form in your skin. For example, inject your insulin above your belly button.  Then the next time use your upper thigh, and then the next time inject below your belly button. Put it in. Turn the dose knob to the number of units of insulin that you need to inject. Push the needle into your skin. Most people can inject using a 90-degree angle and without pinching the skin. Adults and children who are very lean and people who use longer needles may need to pinch the skin to avoid injecting into muscle. Inject and wait. Put your thumb on the injection button, and push it in until it stops. Keep the pen in your skin. Hold the dose knob in for 10 seconds (or to the number that the  recommends). Then pull the needle out of your skin. Do not rub the area. Recap the insulin pen. Put only the outer cap back over the needle. The thin, inner cover is harder to put back on, and you may stick yourself. Throw the needle away. After covering the needle with the outer cap, unscrew the needle. Throw the needle away in a sharps container or other solid plastic container. You can get a sharps container at your drugstore. Don't share insulin pens with anyone else who uses insulin. Even when the needle is changed, an insulin pen can carry bacteria or blood that can make another person sick. Where can you learn more? Go to https://Wear My Tags.Power2SME. org and sign in to your "[employer] account. Enter I100 in the VIDDIX box to learn more about \"Learning About How to Use an Insulin Pen. \"     If you do not have an account, please click on the \"Sign Up Now\" link. Current as of: July 28, 2021               Content Version: 13.1  © 9859-0535 Healthwise, [employer]. Care instructions adapted under license by [facility] ([facility]). If you have questions about a medical condition or this instruction, always ask your healthcare professional. [place] [address] any warranty or liability for your use of this information.

## 2022-01-09 ASSESSMENT — ENCOUNTER SYMPTOMS
SHORTNESS OF BREATH: 0
WHEEZING: 0
ABDOMINAL PAIN: 0

## 2022-01-17 ENCOUNTER — OFFICE VISIT (OUTPATIENT)
Dept: PRIMARY CARE CLINIC | Age: 47
End: 2022-01-17
Payer: COMMERCIAL

## 2022-01-17 VITALS
HEART RATE: 76 BPM | WEIGHT: 230 LBS | SYSTOLIC BLOOD PRESSURE: 120 MMHG | OXYGEN SATURATION: 95 % | HEIGHT: 72 IN | TEMPERATURE: 97.2 F | BODY MASS INDEX: 31.15 KG/M2 | DIASTOLIC BLOOD PRESSURE: 76 MMHG

## 2022-01-17 DIAGNOSIS — I65.23 BILATERAL CAROTID ARTERY STENOSIS: ICD-10-CM

## 2022-01-17 DIAGNOSIS — E11.65 UNCONTROLLED TYPE 2 DIABETES MELLITUS WITH HYPERGLYCEMIA (HCC): Primary | ICD-10-CM

## 2022-01-17 DIAGNOSIS — R60.0 BILATERAL LEG EDEMA: ICD-10-CM

## 2022-01-17 DIAGNOSIS — R91.1 LUNG NODULE: ICD-10-CM

## 2022-01-17 PROCEDURE — 99214 OFFICE O/P EST MOD 30 MIN: CPT | Performed by: FAMILY MEDICINE

## 2022-01-17 PROCEDURE — 4004F PT TOBACCO SCREEN RCVD TLK: CPT | Performed by: FAMILY MEDICINE

## 2022-01-17 PROCEDURE — 2022F DILAT RTA XM EVC RTNOPTHY: CPT | Performed by: FAMILY MEDICINE

## 2022-01-17 PROCEDURE — 3046F HEMOGLOBIN A1C LEVEL >9.0%: CPT | Performed by: FAMILY MEDICINE

## 2022-01-17 PROCEDURE — G8482 FLU IMMUNIZE ORDER/ADMIN: HCPCS | Performed by: FAMILY MEDICINE

## 2022-01-17 PROCEDURE — G8427 DOCREV CUR MEDS BY ELIG CLIN: HCPCS | Performed by: FAMILY MEDICINE

## 2022-01-17 PROCEDURE — G8417 CALC BMI ABV UP PARAM F/U: HCPCS | Performed by: FAMILY MEDICINE

## 2022-01-17 RX ORDER — ASPIRIN 81 MG/1
81 TABLET ORAL DAILY
Qty: 30 TABLET | Refills: 1 | Status: SHIPPED | OUTPATIENT
Start: 2022-01-17

## 2022-01-17 RX ORDER — GLIMEPIRIDE 4 MG/1
4 TABLET ORAL
Qty: 60 TABLET | Refills: 1 | Status: SHIPPED | OUTPATIENT
Start: 2022-01-17 | End: 2022-04-22

## 2022-01-17 ASSESSMENT — ENCOUNTER SYMPTOMS
COUGH: 0
SORE THROAT: 0
ABDOMINAL PAIN: 0
VOMITING: 0
BLOOD IN STOOL: 0
SHORTNESS OF BREATH: 0
NAUSEA: 0

## 2022-01-17 NOTE — PATIENT INSTRUCTIONS
Patient Education      Go to the ER ASAP if you develop any chest pain, shortness of breath, facial droop, slurred speech, weakness/numbness in your arms or legs or double vision! Learning About Carbohydrate (Carb) Counting and Eating Out When You Have Diabetes  Why plan your meals? Meal planning can be a key part of managing diabetes. Planning meals and snacks with the right balance of carbohydrate, protein, and fat can help you keep your blood sugar at the target level you set with your doctor. You don't have to eat special foods. You can eat what your family eats, including sweets once in a while. But you do have to pay attention to how often you eat and how much you eat of certain foods. You may want to work with a dietitian or a certified diabetes educator. He or she can give you tips and meal ideas and can answer your questions about meal planning. This health professional can also help you reach a healthy weight if that is one of your goals. What should you know about eating carbs? Managing the amount of carbohydrate (carbs) you eat is an important part of healthy meals when you have diabetes. Carbohydrate is found in many foods. · Learn which foods have carbs. And learn the amounts of carbs in different foods. ? Bread, cereal, pasta, and rice have about 15 grams of carbs in a serving. A serving is 1 slice of bread (1 ounce), ½ cup of cooked cereal, or 1/3 cup of cooked pasta or rice. ? Fruits have 15 grams of carbs in a serving. A serving is 1 small fresh fruit, such as an apple or orange; ½ of a banana; ½ cup of cooked or canned fruit; ½ cup of fruit juice; 1 cup of melon or raspberries; or 2 tablespoons of dried fruit. ? Milk and no-sugar-added yogurt have 15 grams of carbs in a serving. A serving is 1 cup of milk or 2/3 cup of no-sugar-added yogurt. ? Starchy vegetables have 15 grams of carbs in a serving.  A serving is ½ cup of mashed potatoes or sweet potato; 1 cup winter squash; ½ of a small baked potato; ½ cup of cooked beans; or ½ cup cooked corn or green peas. · Learn how much carbs to eat each day and at each meal. A dietitian or CDE can teach you how to keep track of the amount of carbs you eat. This is called carbohydrate counting. · If you are not sure how to count carbohydrate grams, use the Plate Method to plan meals. It is a good, quick way to make sure that you have a balanced meal. It also helps you spread carbs throughout the day. ? Divide your plate by types of foods. Put non-starchy vegetables on half the plate, meat or other protein food on one-quarter of the plate, and a grain or starchy vegetable in the final quarter of the plate. To this you can add a small piece of fruit and 1 cup of milk or yogurt, depending on how many carbs you are supposed to eat at a meal.  · Try to eat about the same amount of carbs at each meal. Do not \"save up\" your daily allowance of carbs to eat at one meal.  · Proteins have very little or no carbs per serving. Examples of proteins are beef, chicken, turkey, fish, eggs, tofu, cheese, cottage cheese, and peanut butter. A serving size of meat is 3 ounces, which is about the size of a deck of cards. Examples of meat substitute serving sizes (equal to 1 ounce of meat) are 1/4 cup of cottage cheese, 1 egg, 1 tablespoon of peanut butter, and ½ cup of tofu. How can you eat out and still eat healthy? · Learn to estimate the serving sizes of foods that have carbohydrate. If you measure food at home, it will be easier to estimate the amount in a serving of restaurant food. · If the meal you order has too much carbohydrate (such as potatoes, corn, or baked beans), ask to have a low-carbohydrate food instead. Ask for a salad or green vegetables. · If you use insulin, check your blood sugar before and after eating out to help you plan how much to eat in the future.   · If you eat more carbohydrate at a meal than you had planned, take a walk or do other exercise. This will help lower your blood sugar. What are some tips for eating healthy? · Limit saturated fat, such as the fat from meat and dairy products. This is a healthy choice because people who have diabetes are at higher risk of heart disease. So choose lean cuts of meat and nonfat or low-fat dairy products. Use olive or canola oil instead of butter or shortening when cooking. · Don't skip meals. Your blood sugar may drop too low if you skip meals and take insulin or certain medicines for diabetes. · Check with your doctor before you drink alcohol. Alcohol can cause your blood sugar to drop too low. Alcohol can also cause a bad reaction if you take certain diabetes medicines. Follow-up care is a key part of your treatment and safety. Be sure to make and go to all appointments, and call your doctor if you are having problems. It's also a good idea to know your test results and keep a list of the medicines you take. Where can you learn more? Go to https://Power Challenge SwedenpeViewdle.ReadWorks. org and sign in to your St[employer]edia account. Enter R710 in the [employer] box to learn more about \"Learning About Carbohydrate (Carb) Counting and Eating Out When You Have Diabetes. \"     If you do not have an account, please click on the \"Sign Up Now\" link. Current as of: September 8, 2021               Content Version: 13.1  © 0874-8600 Healthwise, Incorporated. Care instructions adapted under license by [facility] ([facility]). If you have questions about a medical condition or this instruction, always ask your healthcare professional. [place] [address] any warranty or liability for your use of this information.

## 2022-01-17 NOTE — PROGRESS NOTES
Chief Complaint   Patient presents with    Diabetes     follow up - pt still having elevated BS         Servando William is a 51 y.o. male who presents for followup office visit regarding uncontrolled diabetes. Pt was seen by Dr. Lela Fernandez 2 weeks ago and his diabetic medications were switched over to basaglar insulin 10 units daily along with amaryl 4 mg daily and pt reports FBS readings between 250 - 300 on thei regiment. Pt states that he has been following a diabetic diet    Pt did see Pulmonologist as directed regarding his spiculated solid nodule in the RML lung and was ordered a PET scan but has been unable to get this procedure done secondary to his elevated blood sugars     Pt is an ex smoker of 2.5 PPD but has been tobacco free x 5 months; positive alcohol use 6 pack beer twice a week but has not been drinking alcohol over the past 5 months; Positive marijuana use nightly for chronic foot burning pain x 4 years    Patient did recent carotid doppler study which showed < 50% atherosclerosis B/L and patient thus needs to follow a low cholesterol diet and remain tobacco free    Pt continues to take zyrtec daily for allergies     FHx negative for CAD, diabetes or lung cancer       Review of Systems   Constitutional: Negative for fatigue and fever. Negative weight loss   HENT: Negative for nosebleeds and sore throat. Eyes:        Negative blurred vision or diplopia   Respiratory: Negative for cough and shortness of breath. Cardiovascular: Positive for palpitations (at times) and leg swelling (at times). Negative for chest pain. Gastrointestinal: Negative for abdominal pain, blood in stool, nausea and vomiting. Negative melena or indigestion   Endocrine: Positive for polydipsia. Negative for polyuria. Genitourinary: Negative for dysuria and hematuria. Musculoskeletal: Negative for arthralgias. Skin: Negative for rash.    Neurological: Negative for dizziness, seizures, syncope, speech difficulty, weakness and headaches. Positive chronic constant burning pain in feet x 4 years   Psychiatric/Behavioral: Negative for dysphoric mood. The patient is not nervous/anxious. Vitals:    01/17/22 0951   BP: 120/76   Pulse: 76   Temp: 97.2 °F (36.2 °C)   SpO2: 95%         Physical Exam  Vitals reviewed. Constitutional:       General: He is not in acute distress. Appearance: He is obese. HENT:      Mouth/Throat:      Mouth: Mucous membranes are moist.      Pharynx: Oropharynx is clear. Eyes:      General: No scleral icterus. Comments: Pink conjunctivae    Neck:      Thyroid: No thyromegaly. Comments: Positive L carotid bruit noted  Cardiovascular:      Heart sounds: Normal heart sounds. No murmur heard. No friction rub. No gallop. Pulmonary:      Effort: Pulmonary effort is normal.      Breath sounds: Normal breath sounds. Abdominal:      Palpations: Abdomen is soft. Tenderness: There is no abdominal tenderness. Musculoskeletal:      Comments: Positive 3 -4+ leg edema but no calf tendernous to palpation noted B/L   Lymphadenopathy:      Cervical: No cervical adenopathy. Skin:     Findings: No rash. Neurological:      Mental Status: He is alert. Comments: Cranial nerves 2 - 12 grossly intact; Muscle strength 5/5 throughout   Psychiatric:         Mood and Affect: Mood normal.         ASSESSMENT AND PLAN    1. Uncontrolled type 2 diabetes mellitus with hyperglycemia ([address]  Pt was told to increase his basaglar insulin to 20 units daily and his amaryl dosage to BID for better control and to record blood sugar readings twice a day and to notify MD of results in 1 week. Patient was also told to follow a strict diabetic diet; avoiding concentrated sweets and consuming a low carbohydrate diet   - Hemoglobin A1C; Future  - Lipid Panel; Future  - Comprehensive Metabolic Panel; Future  - TSH with Reflex; Future  - glimepiride (AMARYL) 4 MG tablet;  Take 1 tablet by mouth 2 times daily (before meals)  Dispense: 60 tablet; Refill: 1    2. Lung nodule  Pt is being followed by Pulmonology and plan is for a PET scan (once his blood sugar is controlled) with possible nodule biopsy for further evaluation    3. Bilateral carotid artery stenosis  Pt with a L carotid bruit on PE and a recent carotid doppler study which showed < 50% atherosclerosis B/L and will start pt on a daily Baby ASA for stroke prevention given his extensive tobacco use history and uncontrolled diabetes. Pt is with a nonfocal neuro exam  - Lipid Panel; Future  - aspirin (ECOTRIN LOW STRENGTH) 81 MG EC tablet; Take 1 tablet by mouth daily  Dispense: 30 tablet; Refill: 1    4. Bilateral leg edema  Pt with stable VS and no associated lung rales on PE and will check bloodwork for further evaluation along with LE venous dopplers to rule out any underlying blood clots as cause  - Comprehensive Metabolic Panel; Future  - CBC Auto Differential; Future  - TSH with Reflex; Future  - VL Extremity Venous Bilateral; Future        Julio Cesar Box MD      Return in about 1 month (around 2/17/2022). Patient Instructions     Patient Education      Go to the ER ASAP if you develop any chest pain, shortness of breath, facial droop, slurred speech, weakness/numbness in your arms or legs or double vision! Learning About Carbohydrate (Carb) Counting and Eating Out When You Have Diabetes  Why plan your meals? Meal planning can be a key part of managing diabetes. Planning meals and snacks with the right balance of carbohydrate, protein, and fat can help you keep your blood sugar at the target level you set with your doctor. You don't have to eat special foods. You can eat what your family eats, including sweets once in a while. But you do have to pay attention to how often you eat and how much you eat of certain foods. You may want to work with a dietitian or a certified diabetes educator.  He or she can give you tips and meal ideas and can answer your questions about meal planning. This health professional can also help you reach a healthy weight if that is one of your goals. What should you know about eating carbs? Managing the amount of carbohydrate (carbs) you eat is an important part of healthy meals when you have diabetes. Carbohydrate is found in many foods. · Learn which foods have carbs. And learn the amounts of carbs in different foods. ? Bread, cereal, pasta, and rice have about 15 grams of carbs in a serving. A serving is 1 slice of bread (1 ounce), ½ cup of cooked cereal, or 1/3 cup of cooked pasta or rice. ? Fruits have 15 grams of carbs in a serving. A serving is 1 small fresh fruit, such as an apple or orange; ½ of a banana; ½ cup of cooked or canned fruit; ½ cup of fruit juice; 1 cup of melon or raspberries; or 2 tablespoons of dried fruit. ? Milk and no-sugar-added yogurt have 15 grams of carbs in a serving. A serving is 1 cup of milk or 2/3 cup of no-sugar-added yogurt. ? Starchy vegetables have 15 grams of carbs in a serving. A serving is ½ cup of mashed potatoes or sweet potato; 1 cup winter squash; ½ of a small baked potato; ½ cup of cooked beans; or ½ cup cooked corn or green peas. · Learn how much carbs to eat each day and at each meal. A dietitian or CDE can teach you how to keep track of the amount of carbs you eat. This is called carbohydrate counting. · If you are not sure how to count carbohydrate grams, use the Plate Method to plan meals. It is a good, quick way to make sure that you have a balanced meal. It also helps you spread carbs throughout the day. ? Divide your plate by types of foods. Put non-starchy vegetables on half the plate, meat or other protein food on one-quarter of the plate, and a grain or starchy vegetable in the final quarter of the plate.  To this you can add a small piece of fruit and 1 cup of milk or yogurt, depending on how many carbs you are supposed to eat at a meal.  · Try to eat about the same amount of carbs at each meal. Do not \"save up\" your daily allowance of carbs to eat at one meal.  · Proteins have very little or no carbs per serving. Examples of proteins are beef, chicken, turkey, fish, eggs, tofu, cheese, cottage cheese, and peanut butter. A serving size of meat is 3 ounces, which is about the size of a deck of cards. Examples of meat substitute serving sizes (equal to 1 ounce of meat) are 1/4 cup of cottage cheese, 1 egg, 1 tablespoon of peanut butter, and ½ cup of tofu. How can you eat out and still eat healthy? · Learn to estimate the serving sizes of foods that have carbohydrate. If you measure food at home, it will be easier to estimate the amount in a serving of restaurant food. · If the meal you order has too much carbohydrate (such as potatoes, corn, or baked beans), ask to have a low-carbohydrate food instead. Ask for a salad or green vegetables. · If you use insulin, check your blood sugar before and after eating out to help you plan how much to eat in the future. · If you eat more carbohydrate at a meal than you had planned, take a walk or do other exercise. This will help lower your blood sugar. What are some tips for eating healthy? · Limit saturated fat, such as the fat from meat and dairy products. This is a healthy choice because people who have diabetes are at higher risk of heart disease. So choose lean cuts of meat and nonfat or low-fat dairy products. Use olive or canola oil instead of butter or shortening when cooking. · Don't skip meals. Your blood sugar may drop too low if you skip meals and take insulin or certain medicines for diabetes. · Check with your doctor before you drink alcohol. Alcohol can cause your blood sugar to drop too low. Alcohol can also cause a bad reaction if you take certain diabetes medicines. Follow-up care is a key part of your treatment and safety.  Be sure to make and go to all appointments, and call your doctor if you are having problems. It's also a good idea to know your test results and keep a list of the medicines you take. Where can you learn more? Go to https://[employer]. org and sign in to your [employer] account. Enter Z542 in the [employer] box to learn more about \"Learning About Carbohydrate (Carb) Counting and Eating Out When You Have Diabetes. \"     If you do not have an account, please click on the \"Sign Up Now\" link. Current as of: September 8, 2021               Content Version: 13.1  © 9072-9835 Healthwise, Incorporated. Care instructions adapted under license by [facility] ([facility]). If you have questions about a medical condition or this instruction, always ask your healthcare professional. [address] any warranty or liability for your use of this information.

## 2022-01-24 ENCOUNTER — TELEPHONE (OUTPATIENT)
Dept: PULMONOLOGY | Age: 47
End: 2022-01-24

## 2022-01-24 NOTE — TELEPHONE ENCOUNTER
Called pt and he is on insulin to try and get his blood sugars under control PCP told him not to lose weight because he will be having major surgery to remove lung mass. Should we bring pt in to discuss biopsy at this point Have been trying for 4 months now to get PET Scan.

## 2022-01-26 ENCOUNTER — OFFICE VISIT (OUTPATIENT)
Dept: PULMONOLOGY | Age: 47
End: 2022-01-26
Payer: COMMERCIAL

## 2022-01-26 VITALS — HEART RATE: 82 BPM | OXYGEN SATURATION: 97 %

## 2022-01-26 DIAGNOSIS — F17.200 CURRENT SMOKER: ICD-10-CM

## 2022-01-26 DIAGNOSIS — R91.1 PULMONARY NODULE: Primary | ICD-10-CM

## 2022-01-26 PROCEDURE — 4004F PT TOBACCO SCREEN RCVD TLK: CPT | Performed by: INTERNAL MEDICINE

## 2022-01-26 PROCEDURE — G8482 FLU IMMUNIZE ORDER/ADMIN: HCPCS | Performed by: INTERNAL MEDICINE

## 2022-01-26 PROCEDURE — G8417 CALC BMI ABV UP PARAM F/U: HCPCS | Performed by: INTERNAL MEDICINE

## 2022-01-26 PROCEDURE — G8427 DOCREV CUR MEDS BY ELIG CLIN: HCPCS | Performed by: INTERNAL MEDICINE

## 2022-01-26 PROCEDURE — 99214 OFFICE O/P EST MOD 30 MIN: CPT | Performed by: INTERNAL MEDICINE

## 2022-01-26 RX ORDER — GLIPIZIDE 5 MG/1
10 TABLET ORAL EVERY MORNING
COMMUNITY
End: 2022-04-22 | Stop reason: ALTCHOICE

## 2022-01-26 NOTE — PROGRESS NOTES
[facility]  Consult Note  Terrell Butler MD       Inderjitlexus Winkler   YOB: 1975    Date of Visit:  1/26/2022    Assessment/Plan:  1. Pulmonary nodule  I reviewed CT imaging. This is a CT of the abdomen and pelvis  Mediastinal structures are not visible and the upper two thirds of the lung fields are not visible. However, he does have a 10 cm spiculated pulmonary nodule in the right lower lobe anterior to the diaphragm. Patient is high risk patient given his smoking habit  He has been unable to get the PET scan because of high blood sugar. He relates that he is try to get the scan 13 times. At this point I think we need to repeat CT imaging  If there is no mediastinal adenopathy, would favor proceeding with IR guided biopsy of the lower lobe nodule. If there is a lesion approachable by endobronchial ultrasound, that might change the approach. I did discuss risks of IR biopsy including bleeding and pneumothorax. The patient understands and agrees with the plan. 2. Current smoker  Patient quit smoking when he heard about the nodule. Committed to remaining complete non-smoker. Chief Complaint   Patient presents with    Abnormal CT     has been trying to get PET Scan done since Oct. But his BS's are always too high PCP put him on insulin Pt feels he needed to lose weight but PCP told him no because when he has his major surgery for his lung mass he will lose weight then. HPI  The patient is referred for evaluation of pulmonary nodule. This was discovered when he was having abdominal symptoms. He was sent for a CT scan of the abdomen and pelvis which revealed a spiculated 10 mm right lower lobe pulmonary nodule. He is not having any chest symptoms. He denies shortness of breath, wheezing, cough or sputum. There is no history of hemoptysis, anorexia or weight loss. He does admit to a 2.5 pack/day smoking habit.   He quit smoking when he found out about the lung nodule. He denies a family history of lung cancer. He has no personal history of cancer. The patient returns today for follow-up of the pulmonary nodule. Since his first visit he has been trying repeatedly to get PET scan. However, his blood sugars been too high. He has not had a scan. Only just recently learned of this. We immediately brought him in today to discuss other options. Review of Systems  No complaint of chest pain, nausea or vomiting    History  I have reviewed past medical, surgical, social and family history. This is documented elsewhere in themedical record. Physical Exam:  Well developed, well nourished  Alert and oriented  Sclera is clear  No cervical adenopathy  No JVD. Chest examination is clear. Cardiac examination reveals regular rate and rhythm without murmur, gallop or rub. The abdomen is soft, nontender and nondistended. There is no clubbing, cyanosis or edema of the extremities. There is no obvious skin rash. No focal neuro deficicts  Normal mood and affect  \    Allergies   Allergen Reactions    Penicillins      Prior to Visit Medications    Medication Sig Taking?  Authorizing Provider   glipiZIDE (GLUCOTROL) 5 MG tablet Take 10 mg by mouth every morning Yes Historical Provider, MD   glimepiride (AMARYL) 4 MG tablet Take 1 tablet by mouth 2 times daily (before meals)  Junior Alcantar MD   aspirin (ECOTRIN LOW STRENGTH) 81 MG EC tablet Take 1 tablet by mouth daily  Junior Alcantar MD   nicotine polacrilex (NICORETTE) 4 MG gum Take 1 each by mouth as needed for Smoking cessation  Simran Sams MD   insulin glargine (BASAGLAR KWIKPEN) 100 UNIT/ML injection pen Inject 10 Units into the skin nightly  Simran Sams MD   nicotine (NICODERM CQ) 14 MG/24HR Place 1 patch onto the skin daily for 14 days  Seferino Nuno MD   Famotidine (PEPCID AC MAXIMUM STRENGTH PO) Take by mouth 2 times daily  Historical Provider, MD   Blood Glucose Monitoring Suppl KIT 1 kit by Does not apply route daily Dispense according to insurance formulary  Axel Jimenez MD   blood glucose test strips (ASCENSIA AUTODISC VI;ONE TOUCH ULTRA TEST VI) strip 1 each by In Vitro route daily Test as directed,Dispense according to insurance formulary  Axel Jimenez MD   Lancets MISC 1 each by Does not apply route daily Test as directed, Dispense according to insurance formulary  Seferino Ragsdale MD   cetirizine (ZYRTEC) 10 MG tablet Take 10 mg by mouth daily  Historical Provider, MD   fluticasone (FLONASE) 50 MCG/ACT nasal spray 1 spray by Each Nostril route daily  Historical Provider, MD       Vitals:    01/26/22 1517   Pulse: 82   SpO2: 97%     There is no height or weight on file to calculate BMI.      Wt Readings from Last 3 Encounters:   01/17/22 230 lb (104.3 kg)   01/07/22 238 lb (108 kg)   10/06/21 231 lb (104.8 kg)     BP Readings from Last 3 Encounters:   01/17/22 120/76   01/07/22 126/82   10/06/21 114/80        Social History     Tobacco Use   Smoking Status Current Every Day Smoker    Packs/day: 2.50    Types: Cigarettes   Smokeless Tobacco Never Used

## 2022-02-12 ENCOUNTER — HOSPITAL ENCOUNTER (OUTPATIENT)
Dept: CT IMAGING | Age: 47
Discharge: HOME | End: 2022-02-12
Payer: COMMERCIAL

## 2022-02-12 DIAGNOSIS — R91.1 PULMONARY NODULE: ICD-10-CM

## 2022-02-12 PROCEDURE — 71250 CT THORAX DX C-: CPT

## 2022-02-18 ENCOUNTER — TELEPHONE (OUTPATIENT)
Dept: PULMONOLOGY | Age: 47
End: 2022-02-18

## 2022-02-18 DIAGNOSIS — R91.1 PULMONARY NODULE: Primary | ICD-10-CM

## 2022-02-21 DIAGNOSIS — R91.1 PULMONARY NODULE: ICD-10-CM

## 2022-02-22 LAB — SARS-COV-2: NOT DETECTED

## 2022-02-24 ENCOUNTER — HOSPITAL ENCOUNTER (OUTPATIENT)
Dept: GENERAL RADIOLOGY | Age: 47
Discharge: HOME | End: 2022-02-24
Payer: COMMERCIAL

## 2022-02-24 ENCOUNTER — HOSPITAL ENCOUNTER (OUTPATIENT)
Dept: GENERAL RADIOLOGY | Age: 47
End: 2022-02-24
Payer: COMMERCIAL

## 2022-02-24 ENCOUNTER — HOSPITAL ENCOUNTER (OUTPATIENT)
Dept: CT IMAGING | Age: 47
Discharge: HOME | End: 2022-02-24
Payer: COMMERCIAL

## 2022-02-24 VITALS
RESPIRATION RATE: 16 BRPM | WEIGHT: 240 LBS | TEMPERATURE: 97.6 F | DIASTOLIC BLOOD PRESSURE: 57 MMHG | OXYGEN SATURATION: 96 % | SYSTOLIC BLOOD PRESSURE: 123 MMHG | BODY MASS INDEX: 32.51 KG/M2 | HEART RATE: 58 BPM | HEIGHT: 72 IN

## 2022-02-24 DIAGNOSIS — R91.1 SOLITARY PULMONARY NODULE: ICD-10-CM

## 2022-02-24 LAB
ANION GAP SERPL CALCULATED.3IONS-SCNC: 9 MMOL/L (ref 3–16)
APTT: 42.1 SEC (ref 26.2–38.6)
BASOPHILS ABSOLUTE: 0.1 K/UL (ref 0–0.2)
BASOPHILS RELATIVE PERCENT: 1.4 %
BUN BLDV-MCNC: 12 MG/DL (ref 7–20)
CALCIUM SERPL-MCNC: 9.5 MG/DL (ref 8.3–10.6)
CHLORIDE BLD-SCNC: 101 MMOL/L (ref 99–110)
CO2: 28 MMOL/L (ref 21–32)
CREAT SERPL-MCNC: 0.7 MG/DL (ref 0.9–1.3)
EOSINOPHILS ABSOLUTE: 0.2 K/UL (ref 0–0.6)
EOSINOPHILS RELATIVE PERCENT: 3.1 %
GFR AFRICAN AMERICAN: >60
GFR NON-AFRICAN AMERICAN: >60
GLUCOSE BLD-MCNC: 238 MG/DL (ref 70–99)
HCT VFR BLD CALC: 49 % (ref 40.5–52.5)
HEMOGLOBIN: 16.6 G/DL (ref 13.5–17.5)
INR BLD: 1.02 (ref 0.88–1.12)
LYMPHOCYTES ABSOLUTE: 1.9 K/UL (ref 1–5.1)
LYMPHOCYTES RELATIVE PERCENT: 28.2 %
MCH RBC QN AUTO: 29.1 PG (ref 26–34)
MCHC RBC AUTO-ENTMCNC: 33.9 G/DL (ref 31–36)
MCV RBC AUTO: 85.7 FL (ref 80–100)
MONOCYTES ABSOLUTE: 0.4 K/UL (ref 0–1.3)
MONOCYTES RELATIVE PERCENT: 6.5 %
NEUTROPHILS ABSOLUTE: 4.1 K/UL (ref 1.7–7.7)
NEUTROPHILS RELATIVE PERCENT: 60.8 %
PDW BLD-RTO: 12.8 % (ref 12.4–15.4)
PLATELET # BLD: 252 K/UL (ref 135–450)
PMV BLD AUTO: 8.8 FL (ref 5–10.5)
POTASSIUM SERPL-SCNC: 5.1 MMOL/L (ref 3.5–5.1)
PROTHROMBIN TIME: 11.5 SEC (ref 9.9–12.7)
RBC # BLD: 5.71 M/UL (ref 4.2–5.9)
SODIUM BLD-SCNC: 138 MMOL/L (ref 136–145)
WBC # BLD: 6.7 K/UL (ref 4–11)

## 2022-02-24 PROCEDURE — 7100000010 HC PHASE II RECOVERY - FIRST 15 MIN

## 2022-02-24 PROCEDURE — 85610 PROTHROMBIN TIME: CPT

## 2022-02-24 PROCEDURE — 7100000011 HC PHASE II RECOVERY - ADDTL 15 MIN

## 2022-02-24 PROCEDURE — 77012 CT SCAN FOR NEEDLE BIOPSY: CPT

## 2022-02-24 PROCEDURE — 80048 BASIC METABOLIC PNL TOTAL CA: CPT

## 2022-02-24 PROCEDURE — 85025 COMPLETE CBC W/AUTO DIFF WBC: CPT

## 2022-02-24 PROCEDURE — 32408 CORE NDL BX LNG/MED PERQ: CPT

## 2022-02-24 PROCEDURE — 71045 X-RAY EXAM CHEST 1 VIEW: CPT

## 2022-02-24 PROCEDURE — 6360000002 HC RX W HCPCS: Performed by: RADIOLOGY

## 2022-02-24 PROCEDURE — 36415 COLL VENOUS BLD VENIPUNCTURE: CPT

## 2022-02-24 PROCEDURE — 85730 THROMBOPLASTIN TIME PARTIAL: CPT

## 2022-02-24 PROCEDURE — 88305 TISSUE EXAM BY PATHOLOGIST: CPT

## 2022-02-24 RX ORDER — ACETAMINOPHEN 325 MG/1
650 TABLET ORAL EVERY 4 HOURS PRN
Status: DISCONTINUED | OUTPATIENT
Start: 2022-02-24 | End: 2022-02-25 | Stop reason: HOSPADM

## 2022-02-24 RX ORDER — MIDAZOLAM HYDROCHLORIDE 1 MG/ML
INJECTION INTRAMUSCULAR; INTRAVENOUS
Status: COMPLETED | OUTPATIENT
Start: 2022-02-24 | End: 2022-02-24

## 2022-02-24 RX ORDER — FENTANYL CITRATE 50 UG/ML
INJECTION, SOLUTION INTRAMUSCULAR; INTRAVENOUS
Status: COMPLETED | OUTPATIENT
Start: 2022-02-24 | End: 2022-02-24

## 2022-02-24 RX ADMIN — FENTANYL CITRATE 50 MCG: 50 INJECTION, SOLUTION INTRAMUSCULAR; INTRAVENOUS at 12:37

## 2022-02-24 RX ADMIN — MIDAZOLAM 0.5 MG: 1 INJECTION INTRAMUSCULAR; INTRAVENOUS at 12:37

## 2022-02-24 ASSESSMENT — PAIN - FUNCTIONAL ASSESSMENT: PAIN_FUNCTIONAL_ASSESSMENT: 0-10

## 2022-02-24 ASSESSMENT — PAIN SCALES - GENERAL: PAINLEVEL_OUTOF10: 0

## 2022-02-24 NOTE — BRIEF OP NOTE
Brief Operative Note      Patient: Rohini Pate MRN: [id number]     YOB: 1975  Age: 49 y.o. Sex: male        DATE OF PROCEDURE: 2/24/2022     PROCEDURE PERFORMED: RLL nodule core biopsy    SURGEON: Allyssa Biggs MD, MD    ANESTHESIA: Fentanyl, Versed    Estimated Blood Loss:none    Complications: None. Device implanted: None. Specimen: 3 cores, 1 part solid, 1 lung tissue only and final solid firm tissue. 18 gauge, 1.0 cm.     Electronically signed by Allyssa Biggs MD on 2/24/2022 at 1:00 PM

## 2022-02-24 NOTE — DISCHARGE INSTR - DIET

## 2022-02-24 NOTE — PROGRESS NOTES
Pt arrive to bay 3 for phase II recovery. Pt is awake and alert, VSS. Biopsy site covered with dry bandage, CD&I.

## 2022-02-25 ENCOUNTER — TELEPHONE (OUTPATIENT)
Dept: INTERVENTIONAL RADIOLOGY/VASCULAR | Age: 47
End: 2022-02-25

## 2022-02-25 ENCOUNTER — TELEPHONE (OUTPATIENT)
Dept: PULMONOLOGY | Age: 47
End: 2022-02-25

## 2022-02-25 DIAGNOSIS — R91.1 PULMONARY NODULE: Primary | ICD-10-CM

## 2022-02-25 NOTE — TELEPHONE ENCOUNTER
----- Message from Kimberly Ahn MD sent at 2/25/2022  4:10 PM EST -----  I called and spoke with the patient. Discussed biopsy results with himRecommend repeat CT scan in 6 months

## 2022-02-28 ENCOUNTER — TELEMEDICINE (OUTPATIENT)
Dept: PRIMARY CARE CLINIC | Age: 47
End: 2022-02-28
Payer: COMMERCIAL

## 2022-02-28 DIAGNOSIS — R91.1 LUNG NODULE: ICD-10-CM

## 2022-02-28 DIAGNOSIS — I65.23 BILATERAL CAROTID ARTERY STENOSIS: ICD-10-CM

## 2022-02-28 DIAGNOSIS — E11.65 UNCONTROLLED TYPE 2 DIABETES MELLITUS WITH HYPERGLYCEMIA (HCC): Primary | ICD-10-CM

## 2022-02-28 PROCEDURE — 3046F HEMOGLOBIN A1C LEVEL >9.0%: CPT | Performed by: FAMILY MEDICINE

## 2022-02-28 PROCEDURE — G8427 DOCREV CUR MEDS BY ELIG CLIN: HCPCS | Performed by: FAMILY MEDICINE

## 2022-02-28 PROCEDURE — G8417 CALC BMI ABV UP PARAM F/U: HCPCS | Performed by: FAMILY MEDICINE

## 2022-02-28 PROCEDURE — 2022F DILAT RTA XM EVC RTNOPTHY: CPT | Performed by: FAMILY MEDICINE

## 2022-02-28 PROCEDURE — 1036F TOBACCO NON-USER: CPT | Performed by: FAMILY MEDICINE

## 2022-02-28 PROCEDURE — G8482 FLU IMMUNIZE ORDER/ADMIN: HCPCS | Performed by: FAMILY MEDICINE

## 2022-02-28 PROCEDURE — 99214 OFFICE O/P EST MOD 30 MIN: CPT | Performed by: FAMILY MEDICINE

## 2022-02-28 ASSESSMENT — ENCOUNTER SYMPTOMS
NAUSEA: 0
SHORTNESS OF BREATH: 0
COUGH: 0
SORE THROAT: 0
ABDOMINAL PAIN: 0
VOMITING: 0
BLOOD IN STOOL: 0

## 2022-02-28 NOTE — PROGRESS NOTES
2022    TELEHEALTH EVALUATION -- Audio/Visual (During KBUUM-11 public health emergency)    HPI:    Rohini Pate (:  1975) has requested an audio/video evaluation for the following concern(s):      Pt presents for followup visit regarding diabetes, carotid stenosis, leg edema and lung nodule. Patient did not do bloodwork or LE venous dopplers as directed    Pt has been taking his basaglar insulin 20 units daily along with amaryl 4 mg BID and reports FBS readings around 250  Pt states that he has been following a diabetic diet but does skip breakfast and lunch a lot of times     Pt is followed by Pulmonologist regarding his spiculated solid nodule in the RML lung and had recent biopsy last week which was Negative for malignancy     Pt is an ex smoker of 2.5 PPD and has been tobacco free x 6 months; positive alcohol use 6 pack beer twice a week but has not been drinking alcohol over the past 6 months; Positive marijuana use nightly for chronic foot burning pain x 4 years     Patient has been taking daily Baby ASA as directed for recent carotid doppler study which showed < 50% atherosclerosis B/L     Pt continues to take zyrtec daily for allergies     FHx negative for CAD, diabetes or lung cancer       Review of Systems   Constitutional: Negative for fatigue and fever. HENT: Negative for nosebleeds and sore throat. Eyes:        Negative blurred vision or diplopia   Respiratory: Negative for cough and shortness of breath. Cardiovascular: Positive for leg swelling (at times). Negative for chest pain and palpitations. Gastrointestinal: Negative for abdominal pain, blood in stool, nausea and vomiting. Negative melena or indigestion   Endocrine: Positive for polydipsia. Negative for polyuria. Genitourinary: Negative for dysuria and hematuria. Musculoskeletal: Negative for arthralgias. Skin: Negative for rash.    Neurological: Negative for dizziness, seizures, syncope, speech difficulty, weakness and headaches. Positive chronic constant burning pain in feet x 4 years   Psychiatric/Behavioral: Negative for dysphoric mood. The patient is not nervous/anxious. Prior to Visit Medications    Medication Sig Taking?  Authorizing Provider   glipiZIDE (GLUCOTROL) 5 MG tablet Take 10 mg by mouth every morning Yes Historical Provider, MD   glimepiride (AMARYL) 4 MG tablet Take 1 tablet by mouth 2 times daily (before meals) Yes Davey King MD   aspirin (ECOTRIN LOW STRENGTH) 81 MG EC tablet Take 1 tablet by mouth daily Yes Davey King MD   nicotine polacrilex (NICORETTE) 4 MG gum Take 1 each by mouth as needed for Smoking cessation Yes Vi Mendoza MD   insulin glargine (BASAGLAR KWIKPEN) 100 UNIT/ML injection pen Inject 10 Units into the skin nightly Yes Vi Mendoza MD   Famotidine (PEPCID AC MAXIMUM STRENGTH PO) Take by mouth 2 times daily Yes Historical Provider, MD   Blood Glucose Monitoring Suppl KIT 1 kit by Does not apply route daily Dispense according to insurance formulary Yes Davey King MD   blood glucose test strips (ASCENSIA AUTODISC VI;ONE TOUCH ULTRA TEST VI) strip 1 each by In Vitro route daily Test as directed,Dispense according to insurance formulary Yes Davey King MD   Lancets MISC 1 each by Does not apply route daily Test as directed, Dispense according to insurance formulary Yes Davey King MD   cetirizine (ZYRTEC) 10 MG tablet Take 10 mg by mouth daily Yes Historical Provider, MD   fluticasone (FLONASE) 50 MCG/ACT nasal spray 1 spray by Each Nostril route daily Yes Historical Provider, MD   nicotine (NICODERM CQ) 14 MG/24HR Place 1 patch onto the skin daily for 14 days  Davey King MD       Social History     Tobacco Use    Smoking status: Former Smoker     Packs/day: 2.50     Types: Cigarettes     Quit date: 2021     Years since quittin.5    Smokeless tobacco: Never Used   Vaping Use    Vaping Use: Never used Substance Use Topics    Alcohol use: Not Currently     Comment: Socially    Drug use: Yes     Types: Marijuana Nestor Vinny)     Comment: Smokes marijuana at night for pain in feet        Allergies   Allergen Reactions    Penicillins        PHYSICAL EXAMINATION:  [ INSTRUCTIONS:  \"[x]\" Indicates a positive item      Vital Signs: (As obtained by patient/caregiver or practitioner observation)    Blood pressure-  Heart rate-    Respiratory rate-    Temperature-  Pulse oximetry-     Constitutional: [x] Appears well-developed and well-nourished [] No apparent distress      [] Abnormal-   Mental status  [x] Alert and awake  [] Oriented to person/place/time []Able to follow commands      Eyes:  EOM    []  Normal  [] Abnormal-  Sclera  [x]  Normal  [] Abnormal -         Discharge [x]  None visible  [] Abnormal -    HENT:   [] Normocephalic, atraumatic. [] Abnormal   [x] Mouth/Throat: Mucous membranes are moist.     External Ears [] Normal  [] Abnormal-     Neck: [] No visualized mass     Pulmonary/Chest: [x] Respiratory effort normal.  [] No visualized signs of difficulty breathing or respiratory distress        [] Abnormal-      Musculoskeletal:   [] Normal gait with no signs of ataxia         [] Normal range of motion of neck        [] Abnormal-       Neurological:        [x] No Facial Asymmetry (Cranial nerve 7 motor function) (limited exam to video visit)          [] No gaze palsy        [] Abnormal-         Skin:        [x] No significant exanthematous lesions or discoloration noted on facial skin         [] Abnormal-            Psychiatric:       [x] Normal Affect [] No Hallucinations        [] Abnormal-     Other pertinent observable physical exam findings-     ASSESSMENT/PLAN:  1. Bilateral carotid artery stenosis  Patient clinically stable on daily Baby ASA and is with no facial droop on PE    2.  Uncontrolled type 2 diabetes mellitus with hyperglycemia (HCC)  Issue appears to be the fact that patient has been skipping meals and pt was told that this can trigger glucose production by his liver especially if he becomes hypoglycemic during sleep. Pt was told to continue his present insulin and amaryl medications but to be sure to eat 3 meals daily and will reevaluate pt in 1 month    3. Lung nodule  Pt with recent biopsy which was negative for malignancy and is followed by Pulmonology and will need to undergo continued surveillance with serial CT scans chest      --- Pt was told to do bloodwork and LE venous dopplers as directed! Patient was told to go to the ER ASAP if you develop any chest pain, shortness of breath, facial droop, slurred speech, weakness/numbness in your arms or legs or double vision! Return in about 1 month (around 3/28/2022). Dyan Laguna, was evaluated through a synchronous (real-time) audio-video encounter. The patient (or guardian if applicable) is aware that this is a billable service, which includes applicable co-pays. This Virtual Visit was conducted with patient's (and/or legal guardian's) consent. The visit was conducted pursuant to the emergency declaration under the [address] [place] [address], [address] [place] [address] [facility] authority and the [email or web address] and [employer] General Act. Patient identification was verified, and a caregiver was present when appropriate. The patient was located at home in a state where the provider was licensed to provide care. Total time spent on this encounter: Not billed by time    --Christina Castaneda MD on 3/1/2022 at 9:45 AM    An electronic signature was used to authenticate this note.

## 2022-03-07 ENCOUNTER — TELEPHONE (OUTPATIENT)
Dept: PRIMARY CARE CLINIC | Age: 47
End: 2022-03-07

## 2022-03-07 DIAGNOSIS — E11.65 UNCONTROLLED TYPE 2 DIABETES MELLITUS WITH HYPERGLYCEMIA (HCC): ICD-10-CM

## 2022-03-07 DIAGNOSIS — M25.511 ACUTE PAIN OF RIGHT SHOULDER: Primary | ICD-10-CM

## 2022-03-07 RX ORDER — INSULIN GLARGINE 100 [IU]/ML
20 INJECTION, SOLUTION SUBCUTANEOUS NIGHTLY
Qty: 5 PEN | Refills: 3 | Status: SHIPPED | OUTPATIENT
Start: 2022-03-07

## 2022-03-07 NOTE — TELEPHONE ENCOUNTER
Medication:   Requested Prescriptions     Pending Prescriptions Disp Refills    insulin glargine (BASAGLAR KWIKPEN) 100 UNIT/ML injection pen 5 pen 3     Sig: Inject 20 Units into the skin nightly       Last Filled:      Patient Phone Number: [phone] (home)     Last appt: 2/28/2022   Next appt: Visit date not found    Last Labs DM:   Lab Results   Component Value Date    LABA1C 9.8 11/04/2020       **UPDATED QUANTITY** - ready to send

## 2022-03-07 NOTE — TELEPHONE ENCOUNTER
----- Message from Mary Thakkar sent at 3/7/2022  1:05 PM EST -----  Subject: Medication Problem    QUESTIONS  Name of Medication? insulin glargine (BASAGLAR KWIKPEN) 100 UNIT/ML   injection pen  Patient-reported dosage and instructions? 20 units daily am  What question or problem do you have with the medication? Pt orginally was   on 10 units daily, Dr Fatemeh Nam upped this dose 20 units, however when pt   went to get refilled the current RX still states 10 units. Please send an   updated RX stating 20 units daily. [facility] [address] [phone] Romeo Carrera [phone]  Pharmacy phone number (if available)? [phone]  Additional Information for Provider? Ok to call wife if Pt doesnt answer. Anette Zelaya [phone]   ---------------------------------------------------------------------------  --------------  Adelfo CALDERON  What is the best way for the office to contact you? OK to leave message on   voicemail  Preferred Call Back Phone Number? [phone]  ---------------------------------------------------------------------------  --------------  SCRIPT ANSWERS  Relationship to Patient?  Self
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
## 2022-03-10 DIAGNOSIS — R07.9 CHEST PAIN, UNSPECIFIED TYPE: Primary | ICD-10-CM

## 2022-03-15 ENCOUNTER — TELEPHONE (OUTPATIENT)
Dept: PRIMARY CARE CLINIC | Age: 47
End: 2022-03-15

## 2022-03-15 ENCOUNTER — HOSPITAL ENCOUNTER (OUTPATIENT)
Dept: GENERAL RADIOLOGY | Age: 47
Discharge: HOME | End: 2022-03-15
Payer: COMMERCIAL

## 2022-03-15 ENCOUNTER — HOSPITAL ENCOUNTER (OUTPATIENT)
Age: 47
Discharge: HOME | End: 2022-03-15
Payer: COMMERCIAL

## 2022-03-15 ENCOUNTER — OFFICE VISIT (OUTPATIENT)
Dept: ORTHOPEDIC SURGERY | Age: 47
End: 2022-03-15
Payer: COMMERCIAL

## 2022-03-15 VITALS — WEIGHT: 259 LBS | HEIGHT: 72 IN | BODY MASS INDEX: 35.08 KG/M2 | RESPIRATION RATE: 18 BRPM

## 2022-03-15 DIAGNOSIS — R07.9 CHEST PAIN, UNSPECIFIED TYPE: ICD-10-CM

## 2022-03-15 DIAGNOSIS — M25.511 ACUTE PAIN OF RIGHT SHOULDER: Primary | ICD-10-CM

## 2022-03-15 DIAGNOSIS — M79.601 RIGHT ARM PAIN: ICD-10-CM

## 2022-03-15 DIAGNOSIS — S46.011A TRAUMATIC TEAR OF RIGHT ROTATOR CUFF, UNSPECIFIED TEAR EXTENT, INITIAL ENCOUNTER: ICD-10-CM

## 2022-03-15 DIAGNOSIS — S49.91XA RIGHT SHOULDER INJURY, INITIAL ENCOUNTER: Primary | ICD-10-CM

## 2022-03-15 PROCEDURE — 99204 OFFICE O/P NEW MOD 45 MIN: CPT | Performed by: PHYSICIAN ASSISTANT

## 2022-03-15 PROCEDURE — G8427 DOCREV CUR MEDS BY ELIG CLIN: HCPCS | Performed by: PHYSICIAN ASSISTANT

## 2022-03-15 PROCEDURE — 1036F TOBACCO NON-USER: CPT | Performed by: PHYSICIAN ASSISTANT

## 2022-03-15 PROCEDURE — 71046 X-RAY EXAM CHEST 2 VIEWS: CPT

## 2022-03-15 PROCEDURE — G8417 CALC BMI ABV UP PARAM F/U: HCPCS | Performed by: PHYSICIAN ASSISTANT

## 2022-03-15 PROCEDURE — G8482 FLU IMMUNIZE ORDER/ADMIN: HCPCS | Performed by: PHYSICIAN ASSISTANT

## 2022-03-15 PROCEDURE — L3660 SO 8 AB RSTR CAN/WEB PRE OTS: HCPCS | Performed by: PHYSICIAN ASSISTANT
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
## 2022-03-15 NOTE — TELEPHONE ENCOUNTER
----- Message from Lou Orellana sent at 3/15/2022 10:37 AM EDT -----  Subject: Referral Request    QUESTIONS   Reason for referral request? Pt needs an order written to have an x-ray of   his entire right arm. Pt stated the x-ray needs to include his shoulder,   elbow, wrist and hand. Pt had a message sent about this last week and the   order has not been placed. Has the physician seen you for this condition before? No   Preferred Specialist (if applicable)? Do you already have an appointment scheduled? No  Additional Information for Provider? Send order for x-ray as soon as   possible, pt is at the hospital to get another x-ray done, and he cannot   return to work until he completes the x-rays. Order for x-ray can be faxed   to [phone]  ---------------------------------------------------------------------------  --------------  CALL BACK INFO  What is the best way for the office to contact you? OK to leave message on   voicemail  Preferred Call Back Phone Number?  [phone]
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
## 2022-03-18 ENCOUNTER — HOSPITAL ENCOUNTER (OUTPATIENT)
Dept: MRI IMAGING | Age: 47
Discharge: HOME | End: 2022-03-18
Payer: COMMERCIAL

## 2022-03-18 ENCOUNTER — HOSPITAL ENCOUNTER (OUTPATIENT)
Dept: VASCULAR LAB | Age: 47
Discharge: HOME | End: 2022-03-18
Payer: COMMERCIAL

## 2022-03-18 DIAGNOSIS — R60.0 BILATERAL LEG EDEMA: ICD-10-CM

## 2022-03-18 DIAGNOSIS — S46.011A TRAUMATIC TEAR OF RIGHT ROTATOR CUFF, UNSPECIFIED TEAR EXTENT, INITIAL ENCOUNTER: ICD-10-CM

## 2022-03-18 PROCEDURE — 93970 EXTREMITY STUDY: CPT

## 2022-03-18 PROCEDURE — 73221 MRI JOINT UPR EXTREM W/O DYE: CPT

## 2022-04-22 DIAGNOSIS — E11.65 UNCONTROLLED TYPE 2 DIABETES MELLITUS WITH HYPERGLYCEMIA (HCC): ICD-10-CM

## 2022-04-22 RX ORDER — GLIMEPIRIDE 4 MG/1
TABLET ORAL
Qty: 60 TABLET | Refills: 1 | Status: SHIPPED | OUTPATIENT
Start: 2022-04-22 | End: 2022-07-05

## 2022-04-22 NOTE — TELEPHONE ENCOUNTER
Patient should be scheduled for diabetes follow up with Dr. Kade Butler soon. Hasn't had A1C checked in over 3 months.     Thanks

## 2022-05-23 DIAGNOSIS — E11.65 UNCONTROLLED TYPE 2 DIABETES MELLITUS WITH HYPERGLYCEMIA (HCC): Primary | ICD-10-CM

## 2022-05-23 RX ORDER — PEN NEEDLE, DIABETIC 31 G X1/4"
1 NEEDLE, DISPOSABLE MISCELLANEOUS DAILY
Qty: 100 EACH | Refills: 3 | Status: SHIPPED | OUTPATIENT
Start: 2022-05-23

## 2022-05-23 RX ORDER — PEN NEEDLE, DIABETIC 31 G X1/4"
1 NEEDLE, DISPOSABLE MISCELLANEOUS DAILY
COMMUNITY
End: 2022-05-23 | Stop reason: SDUPTHER

## 2022-07-02 DIAGNOSIS — E11.65 UNCONTROLLED TYPE 2 DIABETES MELLITUS WITH HYPERGLYCEMIA (HCC): ICD-10-CM

## 2022-07-05 RX ORDER — GLIMEPIRIDE 4 MG/1
TABLET ORAL
Qty: 60 TABLET | Refills: 0 | Status: SHIPPED | OUTPATIENT
Start: 2022-07-05 | End: 2022-09-13

## 2022-07-05 NOTE — TELEPHONE ENCOUNTER
Please inform patient that this is his last warning before we refuse subsequent refills. He needs to schedule appt with Dr. Ashley Zambrano.     Thanks

## 2022-07-05 NOTE — TELEPHONE ENCOUNTER
Medication:   Requested Prescriptions     Pending Prescriptions Disp Refills    glimepiride (AMARYL) 4 MG tablet [Pharmacy Med Name: GLIMEPIRIDE 4 MG TABLET] 60 tablet 1     Sig: TAKE ONE TABLET BY MOUTH TWICE A DAY BEFORE MEALS        Last Filled:  04/22/2022    Patient Phone Number: [phone] (home)     Last appt: 2/28/2022   Next appt: Visit date not found    Last OARRS: No flowsheet data found.

## 2022-08-08 DIAGNOSIS — E11.65 UNCONTROLLED TYPE 2 DIABETES MELLITUS WITH HYPERGLYCEMIA (HCC): ICD-10-CM

## 2022-08-08 RX ORDER — GLIMEPIRIDE 4 MG/1
TABLET ORAL
Qty: 60 TABLET | Refills: 0 | OUTPATIENT
Start: 2022-08-08

## 2022-08-11 ENCOUNTER — TELEMEDICINE (OUTPATIENT)
Dept: PRIMARY CARE CLINIC | Age: 47
End: 2022-08-11
Payer: COMMERCIAL

## 2022-08-11 DIAGNOSIS — U07.1 COVID: Primary | ICD-10-CM

## 2022-08-11 DIAGNOSIS — E11.9 TYPE 2 DIABETES MELLITUS WITHOUT COMPLICATION, WITH LONG-TERM CURRENT USE OF INSULIN (HCC): ICD-10-CM

## 2022-08-11 DIAGNOSIS — Z79.4 TYPE 2 DIABETES MELLITUS WITHOUT COMPLICATION, WITH LONG-TERM CURRENT USE OF INSULIN (HCC): ICD-10-CM

## 2022-08-11 PROCEDURE — 99213 OFFICE O/P EST LOW 20 MIN: CPT | Performed by: FAMILY MEDICINE

## 2022-08-11 PROCEDURE — 2022F DILAT RTA XM EVC RTNOPTHY: CPT | Performed by: FAMILY MEDICINE

## 2022-08-11 PROCEDURE — G8428 CUR MEDS NOT DOCUMENT: HCPCS | Performed by: FAMILY MEDICINE

## 2022-08-11 PROCEDURE — 1036F TOBACCO NON-USER: CPT | Performed by: FAMILY MEDICINE

## 2022-08-11 PROCEDURE — 3046F HEMOGLOBIN A1C LEVEL >9.0%: CPT | Performed by: FAMILY MEDICINE

## 2022-08-11 PROCEDURE — G8417 CALC BMI ABV UP PARAM F/U: HCPCS | Performed by: FAMILY MEDICINE

## 2022-08-11 ASSESSMENT — ENCOUNTER SYMPTOMS
SHORTNESS OF BREATH: 0
DIARRHEA: 0
VOMITING: 0
BACK PAIN: 1
EYE REDNESS: 0
EYE DISCHARGE: 0
ABDOMINAL PAIN: 0
SORE THROAT: 1
COUGH: 1
NAUSEA: 0

## 2022-08-11 NOTE — PROGRESS NOTES
2022    TELEHEALTH EVALUATION -- Audio/Visual (During HERLQ-50 public health emergency)    HPI:    Shelly Benton (:  1975) has requested an audio/video evaluation for the following concern(s):    Pt presents complaining of a sore throat, mucousy cough and back pain yesterday and did a COVID test yesterday which was Positive    Pt did complete his COVID vaccine series     Pt stopped taking his basaglar insulin 20 units daily 3 weeks ago on his own but is taking his amaryl 4 mg BID and reports FBS readings around 150 - 200  Pt states that he has been following a diabetic diet      Pt is followed by Pulmonologist regarding his spiculated solid nodule in the RML lung and had recent biopsy which was Negative for malignancy     Pt is an ex smoker of 2.5 PPD and has been tobacco free x 11 months; Pt no longer is drinking alcohol; Positive marijuana use nightly for chronic foot burning pain x 4 years     Patient has been taking daily Baby ASA as directed for recent carotid doppler study which showed < 50% atherosclerosis B/L but does miss some dosages      Pt continues to take zyrtec and flonase daily for allergies     FHx negative for CAD, diabetes or lung cancer    Review of Systems   Constitutional:  Positive for chills, fatigue and fever. HENT:  Positive for sore throat. Negative for congestion and nosebleeds. Eyes:  Negative for discharge and redness. Respiratory:  Positive for cough (mucousy). Negative for shortness of breath. Cardiovascular:  Positive for leg swelling (at times). Negative for chest pain and palpitations. Gastrointestinal:  Negative for abdominal pain, diarrhea, nausea and vomiting. Endocrine: Negative for polydipsia and polyuria. Genitourinary:  Negative for dysuria and hematuria. Musculoskeletal:  Positive for back pain. Negative for arthralgias. Skin:  Negative for rash.    Neurological:  Negative for dizziness, seizures, syncope, speech difficulty, weakness and headaches. Psychiatric/Behavioral:  Negative for dysphoric mood. The patient is not nervous/anxious. Prior to Visit Medications    Medication Sig Taking?  Authorizing Provider   glimepiride (AMARYL) 4 MG tablet TAKE ONE TABLET BY MOUTH TWICE A DAY BEFORE MEALS  Jayden Flaherty MD   Insulin Pen Needle (PEN NEEDLES) 31G X 6 MM MISC 1 each by Does not apply route daily  Jayden Flaherty MD   insulin glargine (BASAGLAR KWIKPEN) 100 UNIT/ML injection pen Inject 20 Units into the skin nightly  Sondra Gr MD   aspirin (ECOTRIN LOW STRENGTH) 81 MG EC tablet Take 1 tablet by mouth daily  Sondra Gr MD   nicotine polacrilex (NICORETTE) 4 MG gum Take 1 each by mouth as needed for Smoking cessation  Jayden Flaherty MD   nicotine (NICODERM CQ) 14 MG/24HR Place 1 patch onto the skin daily for 14 days  Sondra Gr MD   Famotidine (PEPCID AC MAXIMUM STRENGTH PO) Take by mouth 2 times daily  Historical Provider, MD   Blood Glucose Monitoring Suppl KIT 1 kit by Does not apply route daily Dispense according to insurance Sukhi Burrows MD   blood glucose test strips (ASCENSIA AUTODISC VI;ONE TOUCH ULTRA TEST VI) strip 1 each by In Vitro route daily Test as directed,Dispense according to insurance formulary  Sondra Gr MD   Lancets MISC 1 each by Does not apply route daily Test as directed, Dispense according to insurance formulary  Sondra Gr MD   cetirizine (ZYRTEC) 10 MG tablet Take 10 mg by mouth daily  Historical Provider, MD   fluticasone (FLONASE) 50 MCG/ACT nasal spray 1 spray by Each Nostril route daily  Historical Provider, MD       Social History     Tobacco Use    Smoking status: Former     Packs/day: 2.50     Types: Cigarettes     Quit date: 2021     Years since quittin.9    Smokeless tobacco: Never   Vaping Use    Vaping Use: Never used   Substance Use Topics    Alcohol use: Not Currently     Comment: Socially    Drug use: Yes     Types: Marijuana Barrera Semen) Comment: Smokes marijuana at night for pain in feet        Allergies   Allergen Reactions    Penicillins        PHYSICAL EXAMINATION:  [ INSTRUCTIONS:  \"[x]\" Indicates a positive item     Vital Signs: (As obtained by patient/caregiver or practitioner observation)    Blood pressure-  Heart rate-    Respiratory rate-    Temperature-  Pulse oximetry-     Constitutional: [x] Appears well-developed and well-nourished [] No apparent distress      [] Abnormal-   Mental status  [x] Alert and awake  [] Oriented to person/place/time []Able to follow commands      Eyes:  EOM    []  Normal  [] Abnormal-  Sclera  [x]  Normal  [] Abnormal -         Discharge [x]  None visible  [] Abnormal -    HENT:   [] Normocephalic, atraumatic. [] Abnormal   [x] Mouth/Throat: Mucous membranes are moist.     External Ears [] Normal  [] Abnormal-     Neck: [x] No swollen glands noted over anterior neck on patient self palpation     Pulmonary/Chest: [x] Respiratory effort normal.  [x] No visualized signs of difficulty breathing or respiratory distress        [] Abnormal-      Musculoskeletal:   [] Normal gait with no signs of ataxia         [] Normal range of motion of neck        [] Abnormal-       Neurological:        [x] No Facial Asymmetry (Cranial nerve 7 motor function) (limited exam to video visit)          [] No gaze palsy        [] Abnormal-         Skin:        [x] No significant exanthematous lesions or discoloration noted on facial skin         [] Abnormal-            Psychiatric:       [x] Normal Affect [] No Hallucinations        [] Abnormal-     Other pertinent observable physical exam findings-     ASSESSMENT/PLAN:  1.  Type 2 diabetes mellitus without complication, with long-term current use of insulin (HCC)  Pt recently stopped taking his insulin on his own but is taking his amaryl 4 mg BID and patient was told to follow a strict diabetic diet; avoiding concentrated sweets and consuming a low carbohydrate diet and will reevaluate pt in 6 weeks    2. COVID  Patient was told to take OTC medications for symptomatic relief and needs to quarantine until all three of these things are true: 1) your symptoms are better, 2) it has been 10 days since you first felt sick, and 3) you have had no fever for at least 24 hours without using medicine that lowers fever. Patient was also told to make sure to always wear a face mask and social distance, to not share bedding, towels or utensils and to clean and disinfect high touch areas daily. Finally pt was told to go to the ER ASAP if you develop any CP or SOB! Return in about 6 weeks (around 9/22/2022). Mima Pizarro, was evaluated through a synchronous (real-time) audio-video encounter. The patient (or guardian if applicable) is aware that this is a billable service, which includes applicable co-pays. This Virtual Visit was conducted with patient's (and/or legal guardian's) consent. The visit was conducted pursuant to the emergency declaration under the [address] [place] [address], [address] [place] [address] authority and the [employer] General Act. Patient identification was verified, and a caregiver was present when appropriate. The patient was located at Home: [address] [address]. Provider was located at [facility] (Appt Dept): [address] [place] [address]. Total time spent on this encounter: Not billed by time    --Mian Shaw MD on 8/11/2022 at 12:41 PM    An electronic signature was used to authenticate this note.

## 2022-08-24 ENCOUNTER — TELEMEDICINE (OUTPATIENT)
Dept: PRIMARY CARE CLINIC | Age: 47
End: 2022-08-24
Payer: COMMERCIAL

## 2022-08-24 ENCOUNTER — NURSE TRIAGE (OUTPATIENT)
Dept: OTHER | Facility: CLINIC | Age: 47
End: 2022-08-24

## 2022-08-24 DIAGNOSIS — U07.1 COVID: ICD-10-CM

## 2022-08-24 DIAGNOSIS — K57.92 DIVERTICULITIS: Primary | ICD-10-CM

## 2022-08-24 DIAGNOSIS — J01.90 ACUTE SINUSITIS, RECURRENCE NOT SPECIFIED, UNSPECIFIED LOCATION: ICD-10-CM

## 2022-08-24 PROCEDURE — G8427 DOCREV CUR MEDS BY ELIG CLIN: HCPCS | Performed by: FAMILY MEDICINE

## 2022-08-24 PROCEDURE — G8417 CALC BMI ABV UP PARAM F/U: HCPCS | Performed by: FAMILY MEDICINE

## 2022-08-24 PROCEDURE — 1036F TOBACCO NON-USER: CPT | Performed by: FAMILY MEDICINE

## 2022-08-24 PROCEDURE — 99214 OFFICE O/P EST MOD 30 MIN: CPT | Performed by: FAMILY MEDICINE

## 2022-08-24 RX ORDER — CIPROFLOXACIN 500 MG/1
500 TABLET, FILM COATED ORAL 2 TIMES DAILY
Qty: 20 TABLET | Refills: 0 | Status: SHIPPED | OUTPATIENT
Start: 2022-08-24 | End: 2022-09-03

## 2022-08-24 RX ORDER — METRONIDAZOLE 500 MG/1
500 TABLET ORAL 3 TIMES DAILY
Qty: 30 TABLET | Refills: 0 | Status: SHIPPED | OUTPATIENT
Start: 2022-08-24 | End: 2022-09-03

## 2022-08-24 ASSESSMENT — ENCOUNTER SYMPTOMS
ABDOMINAL PAIN: 1
SORE THROAT: 0
COUGH: 1
NAUSEA: 0
SHORTNESS OF BREATH: 0
VOMITING: 0
DIARRHEA: 1
BLOOD IN STOOL: 0

## 2022-08-24 NOTE — PROGRESS NOTES
Musculoskeletal:  Positive for back pain (L lumbar) and myalgias. Skin:  Negative for rash. Neurological:  Positive for headaches. Negative for dizziness, seizures, syncope, speech difficulty and weakness. Prior to Visit Medications    Medication Sig Taking?  Authorizing Provider   ciprofloxacin (CIPRO) 500 MG tablet Take 1 tablet by mouth 2 times daily for 10 days Yes Chi Palm MD   metroNIDAZOLE (FLAGYL) 500 MG tablet Take 1 tablet by mouth 3 times daily for 10 days Yes Chi Palm MD   glimepiride (AMARYL) 4 MG tablet TAKE ONE TABLET BY MOUTH TWICE A DAY BEFORE MEALS Yes Brittney Cormier MD   Insulin Pen Needle (PEN NEEDLES) 31G X 6 MM MISC 1 each by Does not apply route daily Yes Brittney Cormier MD   insulin glargine (BASAGLAR KWIKPEN) 100 UNIT/ML injection pen Inject 20 Units into the skin nightly Yes Chi Palm MD   aspirin (ECOTRIN LOW STRENGTH) 81 MG EC tablet Take 1 tablet by mouth daily Yes Chi Palm MD   nicotine polacrilex (NICORETTE) 4 MG gum Take 1 each by mouth as needed for Smoking cessation Yes Brittney Cormier MD   Famotidine (PEPCID AC MAXIMUM STRENGTH PO) Take by mouth 2 times daily Yes Historical Provider, MD   Blood Glucose Monitoring Suppl KIT 1 kit by Does not apply route daily Dispense according to insurance formulary Yes Chi Palm MD   blood glucose test strips (ASCENSIA AUTODISC VI;ONE TOUCH ULTRA TEST VI) strip 1 each by In Vitro route daily Test as directed,Dispense according to insurance formulary Yes Chi Palm MD   Lancets MISC 1 each by Does not apply route daily Test as directed, Dispense according to insurance formulary Yes Chi Palm MD   cetirizine (ZYRTEC) 10 MG tablet Take 10 mg by mouth daily Yes Historical Provider, MD   fluticasone (FLONASE) 50 MCG/ACT nasal spray 1 spray by Each Nostril route daily Yes Historical Provider, MD   nicotine (NICODERM CQ) 14 MG/24HR Place 1 patch onto the skin daily for 14 days  Seferino C Juany Begum MD       Social History     Tobacco Use    Smoking status: Former     Packs/day: 2.50     Types: Cigarettes     Quit date: 2021     Years since quittin.0    Smokeless tobacco: Never   Vaping Use    Vaping Use: Never used   Substance Use Topics    Alcohol use: Not Currently     Comment: Socially    Drug use: Yes     Types: Marijuana (Weed)     Comment: Smokes marijuana at night for pain in feet        Allergies   Allergen Reactions    Penicillins        PHYSICAL EXAMINATION:  [ INSTRUCTIONS:  \"[x]\" Indicates a positive item      Vital Signs: (As obtained by patient/caregiver or practitioner observation)    Blood pressure-  Heart rate-    Respiratory rate-    Temperature-  Pulse oximetry-     Constitutional: [x] Appears well-developed and well-nourished [x] No apparent distress      [] Abnormal-   Mental status  [x] Alert and awake  [] Oriented to person/place/time []Able to follow commands      Eyes:  EOM    []  Normal  [] Abnormal-  Sclera  [x]  Normal  [] Abnormal -         Discharge [x]  None visible  [] Abnormal -    HENT:   [] Normocephalic, atraumatic.   [] Abnormal   [x] Mouth/Throat: Mucous membranes are moist.     External Ears [] Normal  [] Abnormal-     Neck: [x] Positive swollen and tender glands noted over anterior neck on patient self palpation    Pulmonary/Chest: [x] Respiratory effort normal.  [x] No visualized signs of difficulty breathing or respiratory distress        [] Abnormal-      Musculoskeletal:   [] Normal gait with no signs of ataxia         [] Normal range of motion of neck        [] Abnormal-       Neurological:        [] No Facial Asymmetry (Cranial nerve 7 motor function) (limited exam to video visit)          [] No gaze palsy        [] Abnormal-         Skin:        [x] No significant exanthematous lesions or discoloration noted on facial skin         [] Abnormal-            Psychiatric:       [x] Normal Affect [] No Hallucinations        [] Abnormal-     Other this note.

## 2022-08-24 NOTE — TELEPHONE ENCOUNTER
Received call from Tip Corona at [facility] with Red Flag Complaint. 8/9/22 positive for COVID      Current Symptoms: headache, nausea, sinus congestion, hip pain, diarrhea x 3 today    Onset: 15 days ago;       Pain Severity: 8/10; Temperature: denies     What has been tried: Excedrinvera, drinking water      Denies - ear pain    Recommended disposition: See in Office Today or Tomorrow    Care advice provided, patient verbalizes understanding; denies any other questions or concerns; instructed to call back for any new or worsening symptoms. Patient/Caller agrees with recommended disposition; writer provided warm transfer to [facility] at [facility] for appointment scheduling     Attention Provider: Thank you for allowing me to participate in the care of your patient. The patient was connected to triage in response to information provided to the ECC/PSC. Please do not respond through this encounter as the response is not directed to a shared pool.           Reason for Disposition   Sinus congestion (pressure, fullness) present > 10 days    Protocols used: Sinus Pain or Congestion-ADULT-[place]

## 2022-08-25 ASSESSMENT — ENCOUNTER SYMPTOMS: BACK PAIN: 1

## 2022-08-29 ENCOUNTER — TELEMEDICINE (OUTPATIENT)
Dept: PRIMARY CARE CLINIC | Age: 47
End: 2022-08-29
Payer: COMMERCIAL

## 2022-08-29 ENCOUNTER — HOSPITAL ENCOUNTER (EMERGENCY)
Age: 47
Discharge: HOME | End: 2022-08-29
Attending: STUDENT IN AN ORGANIZED HEALTH CARE EDUCATION/TRAINING PROGRAM

## 2022-08-29 VITALS
RESPIRATION RATE: 18 BRPM | HEART RATE: 79 BPM | TEMPERATURE: 98.3 F | OXYGEN SATURATION: 97 % | DIASTOLIC BLOOD PRESSURE: 77 MMHG | SYSTOLIC BLOOD PRESSURE: 112 MMHG

## 2022-08-29 DIAGNOSIS — M54.50 ACUTE LEFT-SIDED LOW BACK PAIN WITHOUT SCIATICA: Primary | ICD-10-CM

## 2022-08-29 DIAGNOSIS — S39.012A STRAIN OF LUMBAR REGION, INITIAL ENCOUNTER: Primary | ICD-10-CM

## 2022-08-29 PROCEDURE — 6370000000 HC RX 637 (ALT 250 FOR IP): Performed by: STUDENT IN AN ORGANIZED HEALTH CARE EDUCATION/TRAINING PROGRAM

## 2022-08-29 PROCEDURE — G8417 CALC BMI ABV UP PARAM F/U: HCPCS | Performed by: FAMILY MEDICINE

## 2022-08-29 PROCEDURE — 99283 EMERGENCY DEPT VISIT LOW MDM: CPT

## 2022-08-29 PROCEDURE — 99213 OFFICE O/P EST LOW 20 MIN: CPT | Performed by: FAMILY MEDICINE

## 2022-08-29 PROCEDURE — 1036F TOBACCO NON-USER: CPT | Performed by: FAMILY MEDICINE

## 2022-08-29 PROCEDURE — G8428 CUR MEDS NOT DOCUMENT: HCPCS | Performed by: FAMILY MEDICINE

## 2022-08-29 RX ORDER — ACETAMINOPHEN 500 MG
1000 TABLET ORAL ONCE
Status: COMPLETED | OUTPATIENT
Start: 2022-08-29 | End: 2022-08-29

## 2022-08-29 RX ORDER — LIDOCAINE 4 G/G
1 PATCH TOPICAL ONCE
Status: DISCONTINUED | OUTPATIENT
Start: 2022-08-29 | End: 2022-08-29 | Stop reason: HOSPADM

## 2022-08-29 RX ORDER — LIDOCAINE 4 G/G
1 PATCH TOPICAL ONCE
Status: DISCONTINUED | OUTPATIENT
Start: 2022-08-29 | End: 2022-08-29 | Stop reason: SDUPTHER

## 2022-08-29 RX ORDER — DIAZEPAM 5 MG/1
5 TABLET ORAL EVERY 8 HOURS PRN
Qty: 10 TABLET | Refills: 0 | Status: SHIPPED | OUTPATIENT
Start: 2022-08-29 | End: 2022-09-01

## 2022-08-29 RX ORDER — ACETAMINOPHEN 500 MG
1000 TABLET ORAL EVERY 4 HOURS PRN
COMMUNITY

## 2022-08-29 RX ORDER — DIAZEPAM 5 MG/1
5 TABLET ORAL ONCE
Status: COMPLETED | OUTPATIENT
Start: 2022-08-29 | End: 2022-08-29

## 2022-08-29 RX ADMIN — ACETAMINOPHEN 1000 MG: 500 TABLET ORAL at 20:06

## 2022-08-29 RX ADMIN — DIAZEPAM 5 MG: 5 TABLET ORAL at 20:06

## 2022-08-29 ASSESSMENT — PAIN - FUNCTIONAL ASSESSMENT
PAIN_FUNCTIONAL_ASSESSMENT: 0-10

## 2022-08-29 ASSESSMENT — PAIN SCALES - GENERAL
PAINLEVEL_OUTOF10: 10
PAINLEVEL_OUTOF10: 6
PAINLEVEL_OUTOF10: 9
PAINLEVEL_OUTOF10: 10

## 2022-08-29 ASSESSMENT — PAIN DESCRIPTION - ORIENTATION
ORIENTATION: LOWER
ORIENTATION: MID;LOWER;UPPER

## 2022-08-29 ASSESSMENT — PAIN DESCRIPTION - DESCRIPTORS: DESCRIPTORS: SHARP;SHOOTING

## 2022-08-29 ASSESSMENT — PAIN DESCRIPTION - FREQUENCY: FREQUENCY: CONTINUOUS

## 2022-08-29 ASSESSMENT — PAIN DESCRIPTION - PAIN TYPE: TYPE: ACUTE PAIN

## 2022-08-29 ASSESSMENT — PAIN DESCRIPTION - LOCATION
LOCATION: BACK
LOCATION: BACK

## 2022-08-29 NOTE — ED PROVIDER NOTES
[address] RESIDENT NOTE       Date of evaluation: 8/29/2022    Chief Complaint     Back Pain Ren Hails, shooting pain from lower back center of spine all the way up back. States pain is progressively worsening, unable to do normal activities. Constantly a 7, but shoots to a 10 on any movement. )      History of Present Illness     Sugey Pickett is a 49 y.o. male with PMH significant for diabetes, recent COVID-23, degenerative disc disorder who presents for evaluation of back pain. The patient is that he has been having ongoing bilateral lower back pain, not midline, lateral for the last 3 weeks. He notes no trauma or injury. He notes that this began when he was diagnosed with COVID-19. He is largely recovered from COVID-19. TThe patient states that movement aggravates the pain, and rest, lying flat alleviates the pain. Patient has taken tylenol/ibuprofen with some relief. The patient denies any recent trauma, known history of cancer, IV drug use, loss of bowel or bladder control, focal weakness or numbness, fevers, steroid use. He spoke to his primary care doctor last week and was prescribed antibiotics for presumed diverticulitis for which she is finishing. Other than stated above, no additional aggravating or alleviating factors are noted. Review of Systems     A 10 point review of systems was performed:    Consitutional: Negative for diaphoresis  HENT: Negative for congestion  Eyes: Negative for double vision  Cardiovascular: Negative for PND  Pulmonary: Negative for chest pain, SOB  Abdominal: Negative for abdominal pain, constipation, diarrhea  Skin: Negative for rashes  Neurological: Negative for sensory deficits/weakness  Genitourinary: Negative for dysuria  Psych: Negative for hallucinations    All other systems are negative except as mentioned in HPI.     Past Medical, Surgical, Family, and Social History     He has a past medical history of Erectile dysfunction. He has a past surgical history that includes Tonsillectomy; fracture surgery; and CT NEEDLE BIOPSY LUNG PERCUTANEOUS (2/24/2022). His family history is not on file. He reports that he quit smoking about a year ago. His smoking use included cigarettes. He smoked an average of 2.5 packs per day. He has never used smokeless tobacco. He reports that he does not currently use alcohol. He reports current drug use. Drug: Marijuana Barrera Semen).     Medications     Discharge Medication List as of 8/29/2022  9:01 PM        CONTINUE these medications which have NOT CHANGED    Details   acetaminophen (TYLENOL) 500 MG tablet Take 1,000 mg by mouth every 4 hours as needed for PainHistorical Med      ciprofloxacin (CIPRO) 500 MG tablet Take 1 tablet by mouth 2 times daily for 10 days, Disp-20 tablet, R-0Normal      metroNIDAZOLE (FLAGYL) 500 MG tablet Take 1 tablet by mouth 3 times daily for 10 days, Disp-30 tablet, R-0Normal      glimepiride (AMARYL) 4 MG tablet TAKE ONE TABLET BY MOUTH TWICE A DAY BEFORE MEALS, Disp-60 tablet, R-0Normal      Insulin Pen Needle (PEN NEEDLES) 31G X 6 MM MISC DAILY Starting Mon 5/23/2022, Disp-100 each, R-3, Normal      insulin glargine (BASAGLAR KWIKPEN) 100 UNIT/ML injection pen Inject 20 Units into the skin nightly, Disp-5 pen, R-3Normal      aspirin (ECOTRIN LOW STRENGTH) 81 MG EC tablet Take 1 tablet by mouth daily, Disp-30 tablet, R-1Normal      nicotine polacrilex (NICORETTE) 4 MG gum Take 1 each by mouth as needed for Smoking cessation, Disp-160 each, R-3Coated fruit wave gum, per patient's preferenceNormal      nicotine (NICODERM CQ) 14 MG/24HR Place 1 patch onto the skin daily for 14 days, Disp-14 patch, R-5Normal      Famotidine (PEPCID AC MAXIMUM STRENGTH PO) Take by mouth 2 times dailyHistorical Med      Blood Glucose Monitoring Suppl KIT DAILY Starting Mon 8/23/2021, Disp-1 kit, R-0, NormalDispense according to insurance formulary      blood glucose test strips (ASCENSIA AUTODISC VI;ONE TOUCH ULTRA TEST VI) strip DAILY Starting Mon 8/23/2021, Disp-100 each, R-2, NormalTest as directed,Dispense according to insurance formulary      Lancets MISC DAILY Starting Mon 8/23/2021, Disp-100 each, R-2, NormalTest as directed, Dispense according to insurance formulary      cetirizine (ZYRTEC) 10 MG tablet Take 10 mg by mouth dailyHistorical Med      fluticasone (FLONASE) 50 MCG/ACT nasal spray 1 spray by Each Nostril route dailyHistorical Med             Allergies     He is allergic to penicillins. Physical Exam     INITIAL VITALS: BP: 119/74, Temp: 98.3 °F (36.8 °C), Heart Rate: 95, Resp: 18, SpO2: 98 %   Physical Exam    General:  Well-appearing. Eyes:  Pupils reactive. No discharge from eyes   ENT:  No discharge from nose. OP clear  Neck:  Supple, trachea midline  Pulmonary:   Non-labored breathing. Breath sounds clear bilaterally  Cardiac:  Regular rate and rhythm. No murmurs  Abdomen:  Soft. Non-tender. Non-distended  Musculoskeletal:  No long bone deformity. No midline C/T/L-spine tenderness to palpation. Tenderness to palpation at the lateral lumbar area bilaterally without overlying skin changes. Vascular:  Extremities warm and perfused. Skin:  Dry, no rashes  Extremities:  No peripheral edema  Neuro:  Alert and oriented x3. Moves all four extremities to command. No focal deficit  CN II-XII intact. Sensation grossly intact to light touch. 5/5 strength on ankle plantar flexion and dorsiflexion. Speech and mentation normal.    DiagnosticResults     EKG   No EKG performed. RADIOLOGY:  No orders to display       LABS:   No results found for this visit on 08/29/22. ED BEDSIDE ULTRASOUND:      RECENT VITALS:  BP: 112/77, Temp: 98.3 °F (36.8 °C), Heart Rate: 79,Resp: 18, SpO2: 97 %     Procedures         ED Course     Nursing Notes, Past Medical Hx, Past Surgical Hx, Social Hx, Allergies, and Family Hx were reviewed.     The patient was given the following medications:  Orders Placed This Encounter   Medications    acetaminophen (TYLENOL) tablet 1,000 mg    DISCONTD: lidocaine 4 % external patch 1 patch    lidocaine 4 % external patch 1 patch    diazePAM (VALIUM) tablet 5 mg    diazePAM (VALIUM) 5 MG tablet     Sig: Take 1 tablet by mouth every 8 hours as needed (spasm) for up to 10 doses. Dispense:  10 tablet     Refill:  0       CONSULTS:  None    MEDICAL DECISION MAKING / ASSESSMENT / PLAN     Marcello Suazo is a 49 y.o. male with a history and presentation as described above in HPI. The patient was evaluated by myself and the ED Attending Physician, Dr. Cherie Kirkland. All management and disposition plans were discussed and agreed upon. Upon presentation, the patient was well-appearing, afebrile and hemodynamically stable. Based on our evaluation the patient's presentation is most consistent with muscular strain. The patient is neurologically intact, sensation is intact in the lower extremities, dorsalis pedal pulses 2+ bilaterally. There are no signs on exam of acute neurologic compromise. There is no history of recent trauma that would make us concerned for acute fracture. No history of cancer, IV drug use, immunocompromise, recent bacterial infection, and no fever, makes spinal metastases, epidural access, and osteomyelitis lower on the differential. No significant weakness, saddle anesthesia, or bowel/bladder dysfunction makes cauda equina syndrome and spinal cord compression unlikely. As the patient has taken Tylenol, ibuprofen at home, however can take another dose of Tylenol based on timing, Tylenol, Lidoderm patches, Valium were administered with some improvement of the pain. The patient was ambulatory with a stable gait without assistance. He has a primary care doctor for which she will follow-up within the next 2 to 3 days. We did give him a short as needed prescription for Valium as needed for pain.       We have counseled the patient on appropriate stretching and strengthening exercises to help control symptoms and encouraged follow-up as an outpatient after strict return precautions were provided. Medications received during this ED visit:  Medications   lidocaine 4 % external patch 1 patch (1 patch TransDERmal Patch Applied 8/29/22 2015)   acetaminophen (TYLENOL) tablet 1,000 mg (1,000 mg Oral Given 8/29/22 2006)   diazePAM (VALIUM) tablet 5 mg (5 mg Oral Given 8/29/22 2006)       At this time, the patient was deemed appropriate for discharge. My customary discharge instructions, including strict return precautions for new or worsening symptoms concerning to the patient, were provided. All of patient's questions were answered satisfactorily, and she was subsequently sent home in stable condition. This patient was also evaluated by the attending physician. All care plans were discussed and agreed upon. Clinical Impression     1. Strain of lumbar region, initial encounter        Disposition     PATIENT REFERRED TO:  [address]  [phone]    Schedule an appointment as soon as possible for a visit       DISCHARGE MEDICATIONS:  Discharge Medication List as of 8/29/2022  9:01 PM        START taking these medications    Details   diazePAM (VALIUM) 5 MG tablet Take 1 tablet by mouth every 8 hours as needed (spasm) for up to 10 doses. , Disp-10 tablet, R-0Print             DISPOSITION Decision To Discharge 08/29/2022 08:55:49 PM         Jagdeep Donnelly MD  Resident  08/29/22 [id number]

## 2022-08-29 NOTE — PROGRESS NOTES
2022    TELEHEALTH EVALUATION -- Audio/Visual (During DEAXU-59 public health emergency)    HPI:    Salinas Medina (:  1975) has requested an audio/video evaluation for the following concern(s):    Patient reports worsening L lumbar back pain since last week. The pain is constant 7/10 in intensity with flareups that are 10/10 in intensity. Pt has been taking OTC tylenol w/o relief    Pt is on day 5 of 10 of his Rx cipro and flagyl antibiotics and reports resolution of his diarrhea and abdominal pain and headache/congestion     Pt has a hx of diabetes and did stop taking his basaglar insulin 20 units daily 3 weeks ago on his own but is taking his amaryl 4 mg BID and reports FBS readings around 150 - 200  Pt states that he has been following a diabetic diet      Pt is followed by Pulmonologist regarding his spiculated solid nodule in the RML lung and had recent biopsy which was Negative for malignancy     Pt is an ex smoker of 2.5 PPD and has been tobacco free x 11 months; Pt no longer is drinking alcohol; Positive marijuana use nightly for chronic foot burning pain x 4 years     Patient has been taking daily Baby ASA as directed for recent carotid doppler study which showed < 50% atherosclerosis B/L but does miss some dosages      Pt continues to take zyrtec and flonase daily for allergies     FHx negative for CAD, diabetes or lung cancer     Review of Systems    Prior to Visit Medications    Medication Sig Taking?  Authorizing Provider   ciprofloxacin (CIPRO) 500 MG tablet Take 1 tablet by mouth 2 times daily for 10 days  Seferino Lewis MD   metroNIDAZOLE (FLAGYL) 500 MG tablet Take 1 tablet by mouth 3 times daily for 10 days  Momo Arrieta MD   glimepiride (AMARYL) 4 MG tablet TAKE ONE TABLET BY MOUTH TWICE A DAY BEFORE MEALS  Debbie Gibson MD   Insulin Pen Needle (PEN NEEDLES) 31G X 6 MM MISC 1 each by Does not apply route daily  Debbie Gibson MD   insulin glargine [facility]) [x] Abnormal- patient in moderate distress secondary to back pain  Mental status  [x] Alert and awake  [] Oriented to person/place/time []Able to follow commands      Eyes:  EOM    []  Normal  [] Abnormal-  Sclera  []  Normal  [] Abnormal -         Discharge []  None visible  [] Abnormal -    HENT:   [] Normocephalic, atraumatic. [] Abnormal   [] Mouth/Throat: Mucous membranes are moist.     External Ears [] Normal  [] Abnormal-     Neck: [] No visualized mass     Pulmonary/Chest: [x] Respiratory effort normal.  [x] No visualized signs of difficulty breathing or respiratory distress        [] Abnormal-      Musculoskeletal:   [] Normal gait with no signs of ataxia         [] Normal range of motion of neck        [] Abnormal-       Neurological:        [] No Facial Asymmetry (Cranial nerve 7 motor function) (limited exam to video visit)          [] No gaze palsy        [] Abnormal-         Skin:        [] No significant exanthematous lesions or discoloration noted on facial skin         [] Abnormal-            Psychiatric:       [x] Normal Affect [] No Hallucinations        [] Abnormal-     Other pertinent observable physical exam findings-     ASSESSMENT/PLAN:  1. Acute left-sided low back pain without sciatica  Patient with severe 10/10 L lumbar back pain and was told to go to the ER ASAP for further evaluation with a STAT CT scan abdomen and lumbar spine and treatment with IV/IM pain medication! Return in about 2 months (around 10/29/2022). Sandra Seip, was evaluated through a synchronous (real-time) audio-video encounter. The patient (or guardian if applicable) is aware that this is a billable service, which includes applicable co-pays. This Virtual Visit was conducted with patient's (and/or legal guardian's) consent.  The visit was conducted pursuant to the emergency declaration under the [address], [address] waiver authority and the [employer] and

## 2022-08-29 NOTE — ED PROVIDER NOTES
ED Attending Attestation Note     Date of evaluation: 8/29/2022    This patient was seen by the resident. I have seen and examined the patient, agree with the workup, evaluation, management and diagnosis. The care plan has been discussed. Briefly, this is a gentleman who presents with now several week history of low back pain worse over the past 1 week. On examination, he has no neurologic deficits and does have straight leg raise bilaterally with right greater than left. No midline bony tenderness to palpation. Symptoms are most consistent with acute lumbar strain/sprain with some component of sciatica. No red flag features.       Mk Garcia MD  08/29/22 2000

## 2022-08-30 NOTE — DISCHARGE INSTRUCTIONS
Please take tylenol/ibuprofen as needed for continued pain. Over the next several days, you should maintain a gentle activity level and avoid bed rest, but do not stress your back. With back pain like this, about 50% of people feel better with application of ice packs, and 50% feel better with application of heat therapy, such as a warm compress or heating pad. You should choose whichever feels better for you. Please follow-up with your primary care provider in within one week for recheck of your symptoms. Please return to the emergency department, if you have worsening pain, particularly if this is associated with numbness or weakness of your lower extremities, difficulty with bladder or bowel movements, vomiting, fevers, or other concerning symptoms. You may take valium as needed for muscle spasms. Please be wary as this can cause drowsiness. You may also buy over the counter lidocaine patches as we discussed.

## 2022-09-01 ENCOUNTER — OFFICE VISIT (OUTPATIENT)
Dept: PRIMARY CARE CLINIC | Age: 47
End: 2022-09-01
Payer: COMMERCIAL

## 2022-09-01 VITALS
WEIGHT: 259 LBS | DIASTOLIC BLOOD PRESSURE: 92 MMHG | RESPIRATION RATE: 16 BRPM | HEART RATE: 94 BPM | HEIGHT: 72 IN | BODY MASS INDEX: 35.08 KG/M2 | SYSTOLIC BLOOD PRESSURE: 174 MMHG | OXYGEN SATURATION: 98 %

## 2022-09-01 DIAGNOSIS — M54.50 ACUTE MIDLINE LOW BACK PAIN WITHOUT SCIATICA: Primary | ICD-10-CM

## 2022-09-01 DIAGNOSIS — K57.92 DIVERTICULITIS: ICD-10-CM

## 2022-09-01 DIAGNOSIS — J01.90 ACUTE SINUSITIS, RECURRENCE NOT SPECIFIED, UNSPECIFIED LOCATION: ICD-10-CM

## 2022-09-01 PROCEDURE — 1036F TOBACCO NON-USER: CPT | Performed by: FAMILY MEDICINE

## 2022-09-01 PROCEDURE — G8427 DOCREV CUR MEDS BY ELIG CLIN: HCPCS | Performed by: FAMILY MEDICINE

## 2022-09-01 PROCEDURE — G8417 CALC BMI ABV UP PARAM F/U: HCPCS | Performed by: FAMILY MEDICINE

## 2022-09-01 PROCEDURE — 99214 OFFICE O/P EST MOD 30 MIN: CPT | Performed by: FAMILY MEDICINE

## 2022-09-01 RX ORDER — CYCLOBENZAPRINE HCL 10 MG
10 TABLET ORAL 2 TIMES DAILY
Qty: 14 TABLET | Refills: 0 | Status: SHIPPED | OUTPATIENT
Start: 2022-09-01 | End: 2022-09-08

## 2022-09-01 RX ORDER — TRAMADOL HYDROCHLORIDE 50 MG/1
50 TABLET ORAL EVERY 6 HOURS PRN
Qty: 28 TABLET | Refills: 0 | Status: SHIPPED | OUTPATIENT
Start: 2022-09-01 | End: 2022-09-08

## 2022-09-01 RX ORDER — METHYLPREDNISOLONE 4 MG/1
TABLET ORAL
Qty: 1 KIT | Refills: 0 | Status: SHIPPED | OUTPATIENT
Start: 2022-09-01 | End: 2022-09-07

## 2022-09-01 ASSESSMENT — ENCOUNTER SYMPTOMS
ABDOMINAL PAIN: 0
NAUSEA: 0
VOMITING: 0
DIARRHEA: 0
SHORTNESS OF BREATH: 0
SORE THROAT: 0
BACK PAIN: 1
BLOOD IN STOOL: 0
COUGH: 1

## 2022-09-01 NOTE — PROGRESS NOTES
Chief Complaint   Patient presents with    Lower Back Pain     C/o continued low back pain - went to ED 8/30/2022         Dorian Marcelino is a 49 y.o. male who presents for ER followup visit. Pt was sent to the ER at last office visit secondary to severe lumbar back pain. The pain is constant 7/10 in intensity with flareups that are 10/10 in intensity. In the ER pt was treated with a dose of valium and sent home and pt has been taking OTC tylenol w/o relief     Pt is on day 8 of 10 of his Rx cipro and flagyl antibiotics and reports resolution of his diarrhea and abdominal pain and headache/congestion     Pt has a hx of diabetes and did stop taking his basaglar insulin 20 units daily 3 weeks ago on his own but is taking his amaryl 4 mg BID and reports FBS readings around 150 - 200  Pt states that he has been following a diabetic diet      Pt is followed by Pulmonologist regarding his spiculated solid nodule in the RML lung and had recent biopsy which was Negative for malignancy     Pt is an ex smoker of 2.5 PPD and has been tobacco free x 11 months; Pt no longer is drinking alcohol; Positive marijuana use nightly for chronic foot burning pain x 4 years     Patient has been taking daily Baby ASA as directed for recent carotid doppler study which showed < 50% atherosclerosis B/L      Pt continues to take zyrtec and flonase daily for allergies     FHx negative for CAD, diabetes or lung cancer        Review of Systems   Constitutional:  Negative for chills and fever. HENT:  Negative for congestion, nosebleeds and sore throat. Eyes:         Negative blurred vision or diplopia   Respiratory:  Positive for cough. Negative for shortness of breath. Cardiovascular:  Positive for leg swelling. Negative for chest pain and palpitations. Gastrointestinal:  Negative for abdominal pain, blood in stool, diarrhea, nausea and vomiting. Negative melena or indigestion   Endocrine: Negative for polyuria. Genitourinary:  Negative for dysuria and hematuria. Musculoskeletal:  Positive for back pain (lumbar 7 /10 in intensity). Negative for arthralgias. Skin:  Negative for rash. Neurological:  Negative for dizziness, seizures, syncope, speech difficulty, weakness and headaches. Psychiatric/Behavioral:  Negative for dysphoric mood. The patient is not nervous/anxious. Vitals:    09/01/22 1100   BP: (!) 174/92   Pulse:    Resp:    SpO2:          Physical Exam  Vitals reviewed. Constitutional:       General: He is in acute distress (moderate from lumbar back pain). Appearance: He is obese. HENT:      Mouth/Throat:      Mouth: Mucous membranes are moist.      Pharynx: Oropharynx is clear. Eyes:      General: No scleral icterus. Comments: Pink conjunctivae    Neck:      Thyroid: No thyromegaly. Cardiovascular:      Heart sounds: Normal heart sounds. No murmur heard. No friction rub. No gallop. Pulmonary:      Effort: Pulmonary effort is normal.      Breath sounds: Normal breath sounds. Abdominal:      Palpations: Abdomen is soft. Tenderness: There is no abdominal tenderness. Musculoskeletal:      Comments: Positive 3 -4+ leg edema but no calf tendernous to palpation noted B/L;  Back : nontender vertebral spine to palpation   Lymphadenopathy:      Cervical: No cervical adenopathy. Skin:     Findings: No rash. Neurological:      Mental Status: He is alert. Comments: Cranial nerves 2 - 12 grossly intact; Muscle strength 5/5 throughout; Positive back pain noted on hip flexion B/L    Psychiatric:         Mood and Affect: Mood normal.       ASSESSMENT AND PLAN    1.  Acute midline low back pain without sciatica  Most likely dealing with a herniated disc as cause and will treat pt with steroids and a muscle relaxer along with ultram medication as needed for pain and patient was told to notify MD if you note no improvement in 3 - 4 days  - cyclobenzaprine (FLEXERIL) 10 MG tablet; Take 1 tablet by mouth 2 times daily for 7 days  Dispense: 14 tablet; Refill: 0  - traMADol (ULTRAM) 50 MG tablet; Take 1 tablet by mouth every 6 hours as needed for Pain for up to 7 days. Intended supply: 7 days. Take lowest dose possible to manage pain  Dispense: 28 tablet; Refill: 0  - methylPREDNISolone (MEDROL DOSEPACK) 4 MG tablet; Take by mouth as directed  Dispense: 1 kit; Refill: 0    2. Diverticulitis  Pt clinically improved on Rx cipro and flagyl antibiotics and is with a nontender abdomen on PE    3. Acute sinusitis, recurrence not specified, unspecified location  Pt clinically improved on Rx cipro antibiotic and reports resolution of his headache and congestion      David Feliciano MD      Return in about 2 months (around 11/1/2022).

## 2022-09-07 ENCOUNTER — TELEPHONE (OUTPATIENT)
Dept: CASE MANAGEMENT | Age: 47
End: 2022-09-07

## 2022-09-07 NOTE — TELEPHONE ENCOUNTER
Pt due for follow-up Chest CT. Reminder letter mailed to pt.     Kandis KUON, RN   Lung Nodule Navigator  [facility]  [phone]

## 2022-09-13 DIAGNOSIS — E11.65 UNCONTROLLED TYPE 2 DIABETES MELLITUS WITH HYPERGLYCEMIA (HCC): ICD-10-CM

## 2022-09-13 RX ORDER — GLIMEPIRIDE 4 MG/1
TABLET ORAL
Qty: 60 TABLET | Refills: 2 | Status: SHIPPED | OUTPATIENT
Start: 2022-09-13

## 2022-09-13 NOTE — TELEPHONE ENCOUNTER
Medication:   Requested Prescriptions     Pending Prescriptions Disp Refills    glimepiride (AMARYL) 4 MG tablet [Pharmacy Med Name: GLIMEPIRIDE 4 MG TABLET] 60 tablet 0     Sig: TAKE ONE TABLET BY MOUTH TWICE A DAY BEFORE MEALS       Last Filled:  07/05/2022 #60 with no refills     Patient Phone Number: [phone] (home)     Last appt: 9/1/2022   Next appt: 11/7/2022    Last Labs DM:   Lab Results   Component Value Date/Time    LABA1C 9.8 11/04/2020 09:51 AM

## 2022-09-28 ENCOUNTER — TELEPHONE (OUTPATIENT)
Dept: CASE MANAGEMENT | Age: 47
End: 2022-09-28

## 2022-09-28 NOTE — TELEPHONE ENCOUNTER
Pt due for follow-up Chest CT, per Dr. Thaddeus Martinez. Order in place. Called pt to offer assistance with scheduling. No answer-left vm.     Garth KUON, RN   Lung Nodule Navigator  [facility]  [phone]

## 2023-02-13 DIAGNOSIS — E11.65 UNCONTROLLED TYPE 2 DIABETES MELLITUS WITH HYPERGLYCEMIA (HCC): ICD-10-CM

## 2023-02-13 RX ORDER — GLIMEPIRIDE 4 MG/1
4 TABLET ORAL 2 TIMES DAILY
Qty: 60 TABLET | Refills: 1 | Status: SHIPPED | OUTPATIENT
Start: 2023-02-13

## 2023-02-13 NOTE — TELEPHONE ENCOUNTER
Medication:   Requested Prescriptions     Pending Prescriptions Disp Refills    glimepiride (AMARYL) 4 MG tablet 60 tablet 2     Sig: Take 1 tablet by mouth 2 times daily        Last Filled:  9/13/2022, #60 with 2 refills    Patient Phone Number: [phone] (home)     Last appt: 9/1/2022   Next appt: Visit date not found    Last OARRS: No flowsheet data found.

## 2023-07-27 ENCOUNTER — OFFICE VISIT (OUTPATIENT)
Dept: PRIMARY CARE CLINIC | Age: 48
End: 2023-07-27
Payer: COMMERCIAL

## 2023-07-27 VITALS
WEIGHT: 232 LBS | RESPIRATION RATE: 18 BRPM | OXYGEN SATURATION: 82 % | HEIGHT: 72 IN | BODY MASS INDEX: 31.42 KG/M2 | DIASTOLIC BLOOD PRESSURE: 98 MMHG | TEMPERATURE: 98 F | SYSTOLIC BLOOD PRESSURE: 170 MMHG | HEART RATE: 98 BPM

## 2023-07-27 DIAGNOSIS — E11.65 UNCONTROLLED TYPE 2 DIABETES MELLITUS WITH HYPERGLYCEMIA (HCC): Primary | ICD-10-CM

## 2023-07-27 DIAGNOSIS — R60.0 BILATERAL LEG EDEMA: ICD-10-CM

## 2023-07-27 DIAGNOSIS — M79.89 SOFT TISSUE MASS: ICD-10-CM

## 2023-07-27 DIAGNOSIS — I65.23 BILATERAL CAROTID ARTERY STENOSIS: ICD-10-CM

## 2023-07-27 LAB
CHP ED QC CHECK: NORMAL
GLUCOSE BLD-MCNC: 341 MG/DL
HBA1C MFR BLD: ABNORMAL %

## 2023-07-27 PROCEDURE — 3046F HEMOGLOBIN A1C LEVEL >9.0%: CPT | Performed by: FAMILY MEDICINE

## 2023-07-27 PROCEDURE — 99214 OFFICE O/P EST MOD 30 MIN: CPT | Performed by: FAMILY MEDICINE

## 2023-07-27 PROCEDURE — 1036F TOBACCO NON-USER: CPT | Performed by: FAMILY MEDICINE

## 2023-07-27 PROCEDURE — 83037 HB GLYCOSYLATED A1C HOME DEV: CPT | Performed by: FAMILY MEDICINE

## 2023-07-27 PROCEDURE — G8417 CALC BMI ABV UP PARAM F/U: HCPCS | Performed by: FAMILY MEDICINE

## 2023-07-27 PROCEDURE — G8427 DOCREV CUR MEDS BY ELIG CLIN: HCPCS | Performed by: FAMILY MEDICINE

## 2023-07-27 PROCEDURE — 2022F DILAT RTA XM EVC RTNOPTHY: CPT | Performed by: FAMILY MEDICINE

## 2023-07-27 PROCEDURE — 82962 GLUCOSE BLOOD TEST: CPT | Performed by: FAMILY MEDICINE

## 2023-07-27 RX ORDER — PEN NEEDLE, DIABETIC 31 G X1/4"
1 NEEDLE, DISPOSABLE MISCELLANEOUS DAILY
Qty: 100 EACH | Refills: 3 | Status: SHIPPED | OUTPATIENT
Start: 2023-07-27

## 2023-07-27 RX ORDER — GLIMEPIRIDE 4 MG/1
4 TABLET ORAL 2 TIMES DAILY
Qty: 60 TABLET | Refills: 2 | Status: SHIPPED | OUTPATIENT
Start: 2023-07-27

## 2023-07-27 RX ORDER — INSULIN GLARGINE 100 [IU]/ML
20 INJECTION, SOLUTION SUBCUTANEOUS NIGHTLY
Qty: 5 ADJUSTABLE DOSE PRE-FILLED PEN SYRINGE | Refills: 1 | Status: SHIPPED | OUTPATIENT
Start: 2023-07-27

## 2023-07-27 SDOH — ECONOMIC STABILITY: HOUSING INSECURITY
IN THE LAST 12 MONTHS, WAS THERE A TIME WHEN YOU DID NOT HAVE A STEADY PLACE TO SLEEP OR SLEPT IN A SHELTER (INCLUDING NOW)?: NO

## 2023-07-27 SDOH — ECONOMIC STABILITY: INCOME INSECURITY: HOW HARD IS IT FOR YOU TO PAY FOR THE VERY BASICS LIKE FOOD, HOUSING, MEDICAL CARE, AND HEATING?: NOT HARD AT ALL

## 2023-07-27 SDOH — ECONOMIC STABILITY: FOOD INSECURITY: WITHIN THE PAST 12 MONTHS, YOU WORRIED THAT YOUR FOOD WOULD RUN OUT BEFORE YOU GOT MONEY TO BUY MORE.: NEVER TRUE

## 2023-07-27 SDOH — ECONOMIC STABILITY: FOOD INSECURITY: WITHIN THE PAST 12 MONTHS, THE FOOD YOU BOUGHT JUST DIDN'T LAST AND YOU DIDN'T HAVE MONEY TO GET MORE.: NEVER TRUE

## 2023-07-27 ASSESSMENT — PATIENT HEALTH QUESTIONNAIRE - PHQ9
SUM OF ALL RESPONSES TO PHQ QUESTIONS 1-9: 0
1. LITTLE INTEREST OR PLEASURE IN DOING THINGS: 0
SUM OF ALL RESPONSES TO PHQ QUESTIONS 1-9: 0

## 2023-07-27 ASSESSMENT — ENCOUNTER SYMPTOMS
DIARRHEA: 0
VOMITING: 0
COUGH: 0
SORE THROAT: 0
ABDOMINAL PAIN: 0
SHORTNESS OF BREATH: 0
NAUSEA: 0
BLOOD IN STOOL: 0

## 2023-07-27 NOTE — PROGRESS NOTES
dysphoric mood. The patient is not nervous/anxious. Vitals:    07/27/23 0911   BP: (!) 178/110   Pulse:    Resp:    Temp:    SpO2:      HgBA1c = 9.8   glucose = 238      Physical Exam  Vitals reviewed. Constitutional:       General: He is not in acute distress. HENT:      Mouth/Throat:      Mouth: Mucous membranes are moist.      Pharynx: Oropharynx is clear. Eyes:      General: No scleral icterus. Comments: Pink conjunctivae    Neck:      Thyroid: No thyromegaly. Comments: Positive L carotid bruit noted   Cardiovascular:      Heart sounds: Normal heart sounds. No murmur heard. No friction rub. No gallop. Pulmonary:      Effort: Pulmonary effort is normal.      Breath sounds: Normal breath sounds. Abdominal:      Palpations: Abdomen is soft. Tenderness: There is no abdominal tenderness. Musculoskeletal:      Comments: Positive 2 -3+ leg edema but no calf tendernous to palpation noted B/L   Lymphadenopathy:      Cervical: No cervical adenopathy. Skin:     Findings: No rash. Comments: Positive 3 x 3 cm nontender mobile soft tissue mass noted on L upper back   Neurological:      Mental Status: He is alert. Comments: Cranial nerves 2 - 12 grossly intact; Muscle strength 5/5 throughout   Psychiatric:         Mood and Affect: Mood normal.       ASSESSMENT AND PLAN    1. Uncontrolled type 2 diabetes mellitus with hyperglycemia ([address])  Patient was told that he needs to resume his insulin and follow a stricter diabetic diet; avoiding concentrated sweets and consuming a low carbohydrate diet for control! Pt was given a free glucometer to record random glucose readings and will reevaluate pt in 1 month  - POCT glycosylated hemoglobin (Hb A1C)  - POCT Glucose  - glimepiride (AMARYL) 4 MG tablet; Take 1 tablet by mouth 2 times daily  Dispense: 60 tablet; Refill: 2  - insulin glargine (BASAGLAR KWIKPEN) 100 UNIT/ML injection pen;  Inject 20 Units into the skin nightly  Dispense: 5

## 2023-07-29 ENCOUNTER — HOSPITAL ENCOUNTER (OUTPATIENT)
Dept: ULTRASOUND IMAGING | Age: 48
Discharge: HOME | End: 2023-07-29
Payer: COMMERCIAL

## 2023-07-29 DIAGNOSIS — M79.89 SOFT TISSUE MASS: ICD-10-CM

## 2023-07-29 PROCEDURE — 76999 ECHO EXAMINATION PROCEDURE: CPT

## 2023-07-31 ENCOUNTER — TELEPHONE (OUTPATIENT)
Dept: PRIMARY CARE CLINIC | Age: 48
End: 2023-07-31

## 2023-07-31 NOTE — TELEPHONE ENCOUNTER
----- Message from Sawyer Jorgensen sent at 7/31/2023 11:58 AM EDT -----  Subject: Results Request    QUESTIONS  Results: Ultrasound on back/neck;  Ordered by: Constancia Allegra   Date Performed: 2023-07-29  ---------------------------------------------------------------------------  --------------  Jenna Suggs [facility]    [phone]; OK to leave message on voicemail  ---------------------------------------------------------------------------  --------------

## 2023-08-01 NOTE — TELEPHONE ENCOUNTER
Attempted to contact patient - no answer and mailbox is full and cannot accept messages.     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

## 2023-08-02 NOTE — TELEPHONE ENCOUNTER
Third attempt to contact patient - no answer and unable to LVM. Also called home number which is the wife's number, no answer but was able to LVM for pt to call back.

## 2023-09-28 ENCOUNTER — OFFICE VISIT (OUTPATIENT)
Dept: PRIMARY CARE CLINIC | Age: 48
End: 2023-09-28

## 2023-09-28 VITALS
TEMPERATURE: 96.8 F | HEART RATE: 93 BPM | HEIGHT: 72 IN | SYSTOLIC BLOOD PRESSURE: 146 MMHG | BODY MASS INDEX: 31.15 KG/M2 | RESPIRATION RATE: 16 BRPM | DIASTOLIC BLOOD PRESSURE: 82 MMHG | WEIGHT: 230 LBS | OXYGEN SATURATION: 96 %

## 2023-09-28 DIAGNOSIS — E11.9 TYPE 2 DIABETES MELLITUS WITHOUT COMPLICATION, WITH LONG-TERM CURRENT USE OF INSULIN (HCC): Primary | ICD-10-CM

## 2023-09-28 DIAGNOSIS — R60.0 BILATERAL LEG EDEMA: ICD-10-CM

## 2023-09-28 DIAGNOSIS — I65.23 BILATERAL CAROTID ARTERY STENOSIS: ICD-10-CM

## 2023-09-28 DIAGNOSIS — Z79.4 TYPE 2 DIABETES MELLITUS WITHOUT COMPLICATION, WITH LONG-TERM CURRENT USE OF INSULIN (HCC): Primary | ICD-10-CM

## 2023-09-28 LAB — HBA1C MFR BLD: 9.3 %

## 2023-09-28 PROCEDURE — 99214 OFFICE O/P EST MOD 30 MIN: CPT | Performed by: FAMILY MEDICINE

## 2023-09-28 PROCEDURE — 3046F HEMOGLOBIN A1C LEVEL >9.0%: CPT | Performed by: FAMILY MEDICINE

## 2023-09-28 PROCEDURE — 83036 HEMOGLOBIN GLYCOSYLATED A1C: CPT | Performed by: FAMILY MEDICINE

## 2023-09-28 ASSESSMENT — ENCOUNTER SYMPTOMS
VOMITING: 0
NAUSEA: 0
COUGH: 0
SORE THROAT: 0
BLOOD IN STOOL: 0
SHORTNESS OF BREATH: 0
ABDOMINAL PAIN: 0

## 2023-09-28 NOTE — PROGRESS NOTES
elevated HgBA1c level 9.3 and was told to increase his basaglar insulin to 30 units daily for better control and pt was told to follow a stricter diabetic diet; avoiding concentrated sweets and consuming a low carbohydrate diet        Carlos Alatorre MD      Return in about 3 months (around 12/28/2023). Patient Instructions     Go to the ER ASAP if you develop any chest pain, shortness of breath, facial droop, slurred speech, weakness/numbness in your arms or legs or double vision!

## 2023-12-01 DIAGNOSIS — E11.9 TYPE 2 DIABETES MELLITUS WITHOUT COMPLICATION, WITH LONG-TERM CURRENT USE OF INSULIN (HCC): Primary | ICD-10-CM

## 2023-12-01 DIAGNOSIS — E11.65 UNCONTROLLED TYPE 2 DIABETES MELLITUS WITH HYPERGLYCEMIA (HCC): ICD-10-CM

## 2023-12-01 DIAGNOSIS — Z79.4 TYPE 2 DIABETES MELLITUS WITHOUT COMPLICATION, WITH LONG-TERM CURRENT USE OF INSULIN (HCC): Primary | ICD-10-CM

## 2023-12-01 RX ORDER — ACYCLOVIR 400 MG/1
1 TABLET ORAL
Qty: 9 EACH | Refills: 1 | Status: SHIPPED | OUTPATIENT
Start: 2023-12-01

## 2023-12-01 RX ORDER — INSULIN GLARGINE 100 [IU]/ML
20 INJECTION, SOLUTION SUBCUTANEOUS NIGHTLY
Qty: 5 ADJUSTABLE DOSE PRE-FILLED PEN SYRINGE | Refills: 1 | Status: SHIPPED | OUTPATIENT
Start: 2023-12-01

## 2023-12-01 RX ORDER — GLIMEPIRIDE 4 MG/1
4 TABLET ORAL 2 TIMES DAILY
Qty: 60 TABLET | Refills: 1 | Status: SHIPPED | OUTPATIENT
Start: 2023-12-01

## 2023-12-01 NOTE — TELEPHONE ENCOUNTER
Medication:   Requested Prescriptions     Pending Prescriptions Disp Refills    Continuous Blood Gluc Sensor (DEXCOM G7 SENSOR) MISC 9 each 1     Si each by Does not apply route every 10 days    glimepiride (AMARYL) 4 MG tablet 60 tablet 2     Sig: Take 1 tablet by mouth 2 times daily    insulin glargine (BASAGLAR KWIKPEN) 100 UNIT/ML injection pen 5 Adjustable Dose Pre-filled Pen Syringe 1     Sig: Inject 20 Units into the skin nightly        Last Filled:  pt just got new insurance and needs to change to [facility] for medications    Patient Phone Number: [phone] (home)     Last appt: 2023   Next appt: 2024    Last OARRS:        No data to display

## 2024-01-08 ENCOUNTER — OFFICE VISIT (OUTPATIENT)
Dept: PRIMARY CARE CLINIC | Age: 49
End: 2024-01-08
Payer: COMMERCIAL

## 2024-01-08 VITALS
OXYGEN SATURATION: 98 % | RESPIRATION RATE: 18 BRPM | DIASTOLIC BLOOD PRESSURE: 82 MMHG | TEMPERATURE: 97 F | HEART RATE: 78 BPM | HEIGHT: 72 IN | WEIGHT: 223 LBS | BODY MASS INDEX: 30.2 KG/M2 | SYSTOLIC BLOOD PRESSURE: 136 MMHG

## 2024-01-08 DIAGNOSIS — E11.9 TYPE 2 DIABETES MELLITUS WITHOUT COMPLICATION, WITH LONG-TERM CURRENT USE OF INSULIN (HCC): Primary | ICD-10-CM

## 2024-01-08 DIAGNOSIS — Z23 NEED FOR ZOSTER VACCINATION: ICD-10-CM

## 2024-01-08 DIAGNOSIS — Z23 NEED FOR INFLUENZA VACCINATION: ICD-10-CM

## 2024-01-08 DIAGNOSIS — I65.23 BILATERAL CAROTID ARTERY STENOSIS: ICD-10-CM

## 2024-01-08 DIAGNOSIS — Z79.4 TYPE 2 DIABETES MELLITUS WITHOUT COMPLICATION, WITH LONG-TERM CURRENT USE OF INSULIN (HCC): Primary | ICD-10-CM

## 2024-01-08 DIAGNOSIS — R60.0 BILATERAL LEG EDEMA: ICD-10-CM

## 2024-01-08 DIAGNOSIS — R07.9 CHEST PAIN, UNSPECIFIED TYPE: ICD-10-CM

## 2024-01-08 LAB — HBA1C MFR BLD: 7.7 %

## 2024-01-08 PROCEDURE — 99214 OFFICE O/P EST MOD 30 MIN: CPT | Performed by: FAMILY MEDICINE

## 2024-01-08 PROCEDURE — 3051F HG A1C>EQUAL 7.0%<8.0%: CPT | Performed by: FAMILY MEDICINE

## 2024-01-08 PROCEDURE — 90472 IMMUNIZATION ADMIN EACH ADD: CPT | Performed by: FAMILY MEDICINE

## 2024-01-08 PROCEDURE — 90471 IMMUNIZATION ADMIN: CPT | Performed by: FAMILY MEDICINE

## 2024-01-08 PROCEDURE — 90674 CCIIV4 VAC NO PRSV 0.5 ML IM: CPT | Performed by: FAMILY MEDICINE

## 2024-01-08 PROCEDURE — 83036 HEMOGLOBIN GLYCOSYLATED A1C: CPT | Performed by: FAMILY MEDICINE

## 2024-01-08 PROCEDURE — 90750 HZV VACC RECOMBINANT IM: CPT | Performed by: FAMILY MEDICINE

## 2024-01-08 ASSESSMENT — PATIENT HEALTH QUESTIONNAIRE - PHQ9
SUM OF ALL RESPONSES TO PHQ QUESTIONS 1-9: 0
1. LITTLE INTEREST OR PLEASURE IN DOING THINGS: 0
2. FEELING DOWN, DEPRESSED OR HOPELESS: 0
SUM OF ALL RESPONSES TO PHQ9 QUESTIONS 1 & 2: 0
SUM OF ALL RESPONSES TO PHQ QUESTIONS 1-9: 0

## 2024-01-08 NOTE — PROGRESS NOTES
sodium diet and to keep his legs elevated as much as possible.     3. Bilateral carotid artery stenosis  Patient clinically stable on daily Baby ASA medication and is with a nonfocal neuro exam     4. Chest pain, unspecified type  Pt is with stable VS and a normal heart exam and will check a cardiac stress test for further evaluation  - NM MYOCARDIAL SPECT REST EXERCISE OR RX; Future        MARIA R BOOTHE MD      Return in about 3 months (around 4/8/2024) for physical.       Patient Instructions     Go to the ER ASAP if you develop any pressure chest pain, shortness of breath, facial droop, slurred speech, weakness/numbness in your arms or legs or double vision!

## 2024-01-08 NOTE — PATIENT INSTRUCTIONS
Go to the ER ASAP if you develop any pressure chest pain, shortness of breath, facial droop, slurred speech, weakness/numbness in your arms or legs or double vision!

## 2024-01-26 ENCOUNTER — OFFICE VISIT (OUTPATIENT)
Age: 49
End: 2024-01-26

## 2024-01-26 VITALS
OXYGEN SATURATION: 98 % | HEIGHT: 72 IN | SYSTOLIC BLOOD PRESSURE: 156 MMHG | TEMPERATURE: 98.1 F | DIASTOLIC BLOOD PRESSURE: 85 MMHG | WEIGHT: 221.8 LBS | BODY MASS INDEX: 30.04 KG/M2 | HEART RATE: 83 BPM

## 2024-01-26 DIAGNOSIS — J06.9 VIRAL UPPER RESPIRATORY TRACT INFECTION: Primary | ICD-10-CM

## 2024-01-26 DIAGNOSIS — R03.0 ELEVATED BLOOD PRESSURE READING IN OFFICE WITHOUT DIAGNOSIS OF HYPERTENSION: ICD-10-CM

## 2024-01-26 LAB
INFLUENZA A ANTIBODY: NEGATIVE
INFLUENZA B ANTIBODY: NEGATIVE
Lab: 1111
QC PASS/FAIL: NORMAL
SARS-COV-2 RDRP RESP QL NAA+PROBE: NEGATIVE

## 2024-01-26 RX ORDER — BENZONATATE 200 MG/1
200 CAPSULE ORAL 3 TIMES DAILY PRN
Qty: 21 CAPSULE | Refills: 0 | Status: SHIPPED | OUTPATIENT
Start: 2024-01-26 | End: 2024-02-02

## 2024-01-26 ASSESSMENT — ENCOUNTER SYMPTOMS
COUGH: 1
SHORTNESS OF BREATH: 0
NAUSEA: 0
TROUBLE SWALLOWING: 0
SORE THROAT: 1
RHINORRHEA: 1
VOMITING: 0
DIARRHEA: 1
ABDOMINAL PAIN: 0
BLOOD IN STOOL: 0

## 2024-01-26 NOTE — PATIENT INSTRUCTIONS
New Prescriptions    BENZONATATE (TESSALON) 200 MG CAPSULE    Take 1 capsule by mouth 3 times daily as needed for Cough     Increase fluid intake   Ibuprofen and Tylenol for fever or pain   Follow up with PCP in 1 week or sooner for worsening symptoms.

## 2024-01-26 NOTE — PROGRESS NOTES
Uriel Huang (:  1975) is a 48 y.o. male, New patient, here for evaluation of the following chief complaint(s):  Cough and Pharyngitis (Pt c/o cold symptoms on and off over an week, from yesterday started again cough,cold, chills, body ache an diarrhea.)    ASSESSMENT/PLAN:    ICD-10-CM    1. Viral upper respiratory tract infection  J06.9 POCT Influenza A/B     POCT COVID-19 Rapid, NAAT     benzonatate (TESSALON) 200 MG capsule      2. Elevated blood pressure reading in office without diagnosis of hypertension  R03.0         POC testing: Discussed result(s) with patient  Results for POC orders placed in visit on 24   POCT Influenza A/B   Result Value Ref Range    Influenza A Ab Negative     Influenza B Ab Negative      COVID- negative  Ddx includes: URI, Influenza A/B, Sinusitis, Pneumonia  Disposition: Pt is 47yo male here with URI s/s. BP is elevated. Denies hx of HTN. May be related to acute illness. Advised to f/u with PCP for BP recheck when not ill. Remainder of VSS. Physical Exam as below. Flu A/B and COVID are negative.  Advised supportive care for URI. PRN  Tessalon pearls rx sent.   Reviewed AVS with patient. All questions answered. If symptoms worsen go to the Emergency Department. Follow up in clinic or with PCP as needed. Patient is agreeable with plan.     SUBJECTIVE/OBJECTIVE:  47yo male here with c/o cold symptoms on and off over 1 week. Reports he started again yesterday with cough, congestion, chills, body ache and diarrhea. Endorses sick contacts at work. Care prior to arrival includes otc cough syrup with decongestants and allergy medicine with no significant improvement.        used: No      Vitals:    24 1257 24 1335   BP: (!) 168/83 (!) 156/85   Site:  Right Upper Arm   Position:  Sitting   Cuff Size:  Large Adult   Pulse: 83    Temp: 98.1 °F (36.7 °C)    TempSrc: Oral    SpO2: 98%    Weight: 100.6 kg (221 lb 12.8 oz)    Height: 1.829 m (6')

## 2024-02-10 DIAGNOSIS — E11.65 UNCONTROLLED TYPE 2 DIABETES MELLITUS WITH HYPERGLYCEMIA (HCC): ICD-10-CM

## 2024-02-12 RX ORDER — GLIMEPIRIDE 4 MG/1
4 TABLET ORAL 2 TIMES DAILY
Qty: 60 TABLET | Refills: 2 | Status: SHIPPED | OUTPATIENT
Start: 2024-02-12

## 2024-02-12 NOTE — TELEPHONE ENCOUNTER
Medication:   Requested Prescriptions     Pending Prescriptions Disp Refills    glimepiride (AMARYL) 4 MG tablet [Pharmacy Med Name: GLIMEPIRIDE 4 MG TABLET] 60 tablet 1     Sig: TAKE 1 TABLET BY MOUTH TWICE A DAY        Last Filled:  12/01/2023 #60 with 1 rfill    Patient Phone Number: [phone] (home)     Last appt: 1/8/2024   Next appt: 4/8/2024    Last OARRS:        No data to display

## 2024-02-26 ENCOUNTER — TELEPHONE (OUTPATIENT)
Dept: PRIMARY CARE CLINIC | Age: 49
End: 2024-02-26

## 2024-02-26 NOTE — TELEPHONE ENCOUNTER
Pt went to the Optometrist on Saturday. He is in in [place] at [employer]. 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 Pt needs to have surgery. He does not have it scheduled yet. He is looking for a surgeon to do this for him. He has places to look into for this. He just wanted Dr. Crow to be aware of what is going on.

## 2024-03-14 DIAGNOSIS — E11.65 UNCONTROLLED TYPE 2 DIABETES MELLITUS WITH HYPERGLYCEMIA (HCC): ICD-10-CM

## 2024-03-14 DIAGNOSIS — E11.9 TYPE 2 DIABETES MELLITUS WITHOUT COMPLICATION, WITH LONG-TERM CURRENT USE OF INSULIN (HCC): ICD-10-CM

## 2024-03-14 DIAGNOSIS — Z79.4 TYPE 2 DIABETES MELLITUS WITHOUT COMPLICATION, WITH LONG-TERM CURRENT USE OF INSULIN (HCC): ICD-10-CM

## 2024-03-14 RX ORDER — PEN NEEDLE, DIABETIC 31 G X1/4"
1 NEEDLE, DISPOSABLE MISCELLANEOUS DAILY
Qty: 100 EACH | Refills: 3 | Status: SHIPPED | OUTPATIENT
Start: 2024-03-14

## 2024-03-14 RX ORDER — GLIMEPIRIDE 4 MG/1
4 TABLET ORAL 2 TIMES DAILY
Qty: 180 TABLET | Refills: 1 | Status: SHIPPED | OUTPATIENT
Start: 2024-03-14

## 2024-03-14 RX ORDER — ACYCLOVIR 400 MG/1
1 TABLET ORAL
Qty: 10 EACH | Refills: 1 | Status: SHIPPED | OUTPATIENT
Start: 2024-03-14

## 2024-03-14 RX ORDER — INSULIN GLARGINE 100 [IU]/ML
20 INJECTION, SOLUTION SUBCUTANEOUS NIGHTLY
Qty: 9 ADJUSTABLE DOSE PRE-FILLED PEN SYRINGE | Refills: 2 | Status: SHIPPED | OUTPATIENT
Start: 2024-03-14

## 2024-03-14 NOTE — TELEPHONE ENCOUNTER
Medication:   Requested Prescriptions     Pending Prescriptions Disp Refills    glimepiride (AMARYL) 4 MG tablet 90 tablet 0     Sig: Take 1 tablet by mouth 2 times daily    insulin glargine (BASAGLAR KWIKPEN) 100 UNIT/ML injection pen 90 Adjustable Dose Pre-filled Pen Syringe 0     Sig: Inject 20 Units into the skin nightly    Insulin Pen Needle (PEN NEEDLES) 31G X 6 MM MISC 100 each 3     Si each by Does not apply route daily    Continuous Blood Gluc Sensor (DEXCOM G7 SENSOR) MISC 90 each 0     Si each by Does not apply route every 10 days       Last Filled: glimepiride-2024 #60 with 2 refills   Insulin-2023 #5 adjustable syringes with 1 refill  Pen needles-2023 #100 with 3 refills   Dexcom-2023 #9 each with 1 refill     Patient Phone Number: [phone] (home)     Last appt: 2024   Next appt: 2024    Last Labs DM:   Lab Results   Component Value Date/Time    LABA1C 7.7 2024 08:42 AM

## 2024-03-14 NOTE — TELEPHONE ENCOUNTER
Patient requesting a medication refill.  Medication Glimepiride (Amaryl)   Dosage  4 mg  Frequency Take 1 tablet by mouth twice a day. Asking for 90 day supply  Last filled on  2/12/24  Pharmacy [facility] [phone] Fax [phone]  Next office visit 4/8/24      Patient requesting a medication refill.  Medication  Basaglar Kwikpen)   Dosage  100 UNIT/ML  Frequency inject 20 units into the skin nightly. Requesting 90 day supply  Last filled on  12/1/23  Pharmacy [facility].  [phone] Fax [phone]  Next office visit 4/8/24    Dexcom needs 90 day supply    Box of needles 90 day supply

## 2024-03-15 LAB — DIABETIC RETINOPATHY: POSITIVE

## 2024-04-10 ENCOUNTER — TELEPHONE (OUTPATIENT)
Dept: PRIMARY CARE CLINIC | Age: 49
End: 2024-04-10

## 2024-04-10 DIAGNOSIS — E11.9 TYPE 2 DIABETES MELLITUS WITHOUT COMPLICATION, WITH LONG-TERM CURRENT USE OF INSULIN (HCC): Primary | ICD-10-CM

## 2024-04-10 DIAGNOSIS — Z79.4 TYPE 2 DIABETES MELLITUS WITHOUT COMPLICATION, WITH LONG-TERM CURRENT USE OF INSULIN (HCC): Primary | ICD-10-CM

## 2024-04-10 RX ORDER — INSULIN GLARGINE 100 [IU]/ML
20 INJECTION, SOLUTION SUBCUTANEOUS NIGHTLY
Qty: 5 ADJUSTABLE DOSE PRE-FILLED PEN SYRINGE | Refills: 2 | Status: SHIPPED | OUTPATIENT
Start: 2024-04-10

## 2024-04-10 NOTE — TELEPHONE ENCOUNTER
Pt called to state he needs his Basaglar refilled. Pt was told that he should have refills at the pharmacy he says that they are waiting for more information from the PCP office. Called [facility]. They say he has refills but his insurance will not pay for the medication. He either needs to use Lantus or get a PA for Basaglar.    Pt uses [facility] on [facility]. Phone is [phone] Fax [phone].

## 2024-04-10 NOTE — TELEPHONE ENCOUNTER
Mail box is full, unable to leave a message. I will send patient a [email or web address] message.No further action is needed for this encounter.

## 2024-07-09 ENCOUNTER — COMMUNITY OUTREACH (OUTPATIENT)
Dept: PRIMARY CARE CLINIC | Age: 49
End: 2024-07-09

## 2024-08-19 NOTE — PRE SEDATION
Mother called to clarify Acetaminophen and Ibuprofen dosing. Patient had a tonsillectomy today and was told she could give 14.5 mL of each medication as needed. I was unable to see the note and told the mother that I would have to give the manufactures recommendation.    Reported weight today: 68.2 lbs    Acetaminophen 160 mg/5 mL: 12.5 mL PO Q4H PRN not exceeding 4 doses in 24 hours.    Ibuprofen 100 mg/5 mL: 12.5 mL PO Q6H PRN not exceeding 4 doses in 24 hours.    Ivanna Worley, RN, BSN   Sedation Pre-Procedure Note    Patient Name: Amy Leija   YOB: 1975  Room/Bed: Room/bed info not found  Medical Record Number: [id number]  Date: 2/24/2022   Time: 12:12 PM       Indication:  RIght lung base nodule core biopsy    Consent: I have discussed with the patient and/or the patient representative the indication, alternatives, and the possible risks and/or complications of the planned procedure and the anesthesia methods. The patient and/or patient representative appear to understand and agree to proceed. Vital Signs:   Vitals:    02/24/22 1110   BP: (!) 164/85   Pulse: 67   Resp: 18   Temp: 97.1 °F (36.2 °C)   SpO2: 97%       Past Medical History:   has a past medical history of Erectile dysfunction. Past Surgical History:   has a past surgical history that includes Tonsillectomy and fracture surgery. Medications:   Scheduled Meds:    methylPREDNISolone acetate  40 mg IntraMUSCular Once     Continuous Infusions:   PRN Meds:   Home Meds:   Prior to Admission medications    Medication Sig Start Date End Date Taking?  Authorizing Provider   glimepiride (AMARYL) 4 MG tablet Take 1 tablet by mouth 2 times daily (before meals) 1/17/22  Yes Della Benson MD   glipiZIDE (GLUCOTROL) 5 MG tablet Take 10 mg by mouth every morning    Historical Provider, MD   aspirin (ECOTRIN LOW STRENGTH) 81 MG EC tablet Take 1 tablet by mouth daily 1/17/22   Della Benson MD   nicotine polacrilex (NICORETTE) 4 MG gum Take 1 each by mouth as needed for Smoking cessation 1/7/22   Stacy Lai MD   insulin glargine [facility]) 100 UNIT/ML injection pen Inject 10 Units into the skin nightly 1/7/22   Stacy Lai MD   nicotine (NICODERM CQ) 14 MG/24HR Place 1 patch onto the skin daily for 14 days 9/14/21 9/28/21  Della Benson MD   Famotidine (PEPCID AC MAXIMUM STRENGTH PO) Take by mouth 2 times daily    Historical Provider, MD   Blood Glucose Monitoring Suppl KIT 1 kit by Does not apply route daily Dispense according to insurance formulary 8/23/21   Urbano Novoa MD   blood glucose test strips (ASCENSIA AUTODISC VI;ONE TOUCH ULTRA TEST VI) strip 1 each by In Vitro route daily Test as directed,Dispense according to insurance formulary 8/23/21   Urbano Novoa MD   Lancets MISC 1 each by Does not apply route daily Test as directed, Dispense according to insurance formulary 8/23/21   Urbano Novoa MD   cetirizine (ZYRTEC) 10 MG tablet Take 10 mg by mouth daily    Historical Provider, MD   fluticasone (FLONASE) 50 MCG/ACT nasal spray 1 spray by Each Nostril route daily    Historical Provider, MD     Coumadin Use Last 7 Days:  no  Antiplatelet drug therapy use last 7 days: no  Other anticoagulant use last 7 days: no  Additional Medication Information:  na      Pre-Sedation Documentation and Exam:   I have reviewed the patient's history and review of systems.     Mallampati Airway Assessment:  Mallampati Class II - (soft palate, fauces & uvula are visible)    Prior History of Anesthesia Complications:   none    ASA Classification:  Class 1 - A normal healthy patient    Sedation/ Anesthesia Plan:   intravenous sedation    Medications Planned:   midazolam (Versed) intravenously and fentanyl intravenously    Patient is an appropriate candidate for plan of sedation: yes    Electronically signed by Sandy Jones MD on 2/24/2022 at 12:12 PM Him/He

## 2024-10-19 NOTE — PATIENT INSTRUCTIONS
Patient Education        Go to the ER ASAP if you develop any chest pain, shortness of breath, facial droop, slurred speech, weakness/numbness in your arms or legs or double vision! Go to the ER ASAP if you develop any worsening abdominal pain, vomiting or fever! Counting Carbohydrates for Diabetes: Care Instructions  Your Care Instructions     You don't have to eat special foods when you have diabetes. You just have to be careful to eat healthy foods. Carbohydrates (carbs) raise blood sugar higher and quicker than any other nutrient. Carbs are found in desserts, breads and cereals, and fruit. They're also in starchy vegetables. These include potatoes, corn, and grains such as rice and pasta. Carbs are also in milk and yogurt. The more carbs you eat at one time, the higher your blood sugar will rise. Spreading carbs all through the day helps keep your blood sugar levels within your target range. Counting carbs is one of the best ways to keep your blood sugar under control. If you use insulin, counting carbs helps you match the right amount of insulin to the number of grams of carbs in a meal. Then you can change your diet and insulin dose as needed. Testing your blood sugar several times a day can help you learn how carbs affect your blood sugar. A registered dietitian or certified diabetes educator can help you plan meals and snacks. Follow-up care is a key part of your treatment and safety. Be sure to make and go to all appointments, and call your doctor if you are having problems. It's also a good idea to know your test results and keep a list of the medicines you take. How can you care for yourself at home? Know your daily amount of carbohydrates  Your daily amount depends on several things, such as your weight, how active you are, which diabetes medicines you take, and what your goals are for your blood sugar levels.  A registered dietitian or certified diabetes educator can help you plan how many carbs to include in each meal and snack. For most adults, a guideline for the daily amount of carbs is:  · 45 to 60 grams at each meal. That's about the same as 3 to 4 carbohydrate servings. · 15 to 20 grams at each snack. That's about the same as 1 carbohydrate serving. Count carbs  Counting carbs lets you know how much rapid-acting insulin to take before you eat. If you use an insulin pump, you get a constant rate of insulin during the day. So the pump must be programmed at meals. This gives you extra insulin to cover the rise in blood sugar after meals. If you take insulin:  · Learn your own insulin-to-carb ratio. You and your diabetes health professional will figure out the ratio. You can do this by testing your blood sugar after meals. For example, you may need a certain amount of insulin for every 15 grams of carbs. · Add up the carb grams in a meal. Then you can figure out how many units of insulin to take based on your insulin-to-carb ratio. · Exercise lowers blood sugar. You can use less insulin than you would if you were not doing exercise. Keep in mind that timing matters. If you exercise within 1 hour after a meal, your body may need less insulin for that meal than it would if you exercised 3 hours after the meal. Test your blood sugar to find out how exercise affects your need for insulin. If you do or don't take insulin:  · Look at labels on packaged foods. This can tell you how many carbs are in a serving. You can also use guides from the American Diabetes Association. · Be aware of portions, or serving sizes. If a package has two servings and you eat the whole package, you need to double the number of grams of carbohydrate listed for one serving. · Protein, fat, and fiber do not raise blood sugar as much as carbs do. If you eat a lot of these nutrients in a meal, your blood sugar will rise more slowly than it would otherwise.   Eat from all food groups  · Eat at least three meals a day.  · Plan meals to include food from all the food groups. The food groups include grains, fruits, dairy, proteins, and vegetables. · Talk to your dietitian or diabetes educator about ways to add limited amounts of sweets into your meal plan. · If you drink alcohol, talk to your doctor. It may not be recommended when you are taking certain diabetes medicines. Where can you learn more? Go to [email or web address] and sign in to your [employer] account. Enter V096 in the Physitrack box to learn more about \"Counting Carbohydrates for Diabetes: Care Instructions. \"     If you do not have an account, please click on the \"Sign Up Now\" link. Current as of: August 31, 2020               Content Version: 12.9  © 4490-9724 [employer]. Care instructions adapted under license by [facility] ([facility]). If you have questions about a medical condition or this instruction, always ask your healthcare professional. [place] [address] any warranty or liability for your use of this information. Serneity

## 2024-10-26 DIAGNOSIS — E11.65 UNCONTROLLED TYPE 2 DIABETES MELLITUS WITH HYPERGLYCEMIA (HCC): ICD-10-CM

## 2024-10-28 NOTE — TELEPHONE ENCOUNTER
Medication:   Requested Prescriptions     Pending Prescriptions Disp Refills    glimepiride (AMARYL) 4 MG tablet [Pharmacy Med Name: GLIMEPIRIDE 4 MG TABLET] 180 tablet 1     Sig: TAKE 1 TABLET BY MOUTH TWICE A DAY        Last Filled:  03/14/2024 # 180 refills 1 ordered.    Patient Phone Number: [phone] (home)     Last appt: 1/8/2024   Next appt: Visit date not found    Last OARRS:        No data to display

## 2024-10-29 RX ORDER — GLIMEPIRIDE 4 MG/1
4 TABLET ORAL 2 TIMES DAILY
Qty: 180 TABLET | Refills: 0 | Status: SHIPPED | OUTPATIENT
Start: 2024-10-29

## 2024-10-30 NOTE — TELEPHONE ENCOUNTER
Pt works Sunday through Thursday. He will check his schedule to see when he can get off to come in. He may need to switch PCP.    No further action required.

## 2024-11-21 ENCOUNTER — OFFICE VISIT (OUTPATIENT)
Dept: PRIMARY CARE CLINIC | Age: 49
End: 2024-11-21

## 2024-11-21 VITALS
HEIGHT: 72 IN | WEIGHT: 234 LBS | BODY MASS INDEX: 31.69 KG/M2 | DIASTOLIC BLOOD PRESSURE: 89 MMHG | OXYGEN SATURATION: 99 % | SYSTOLIC BLOOD PRESSURE: 122 MMHG | RESPIRATION RATE: 18 BRPM | HEART RATE: 97 BPM | TEMPERATURE: 97.7 F

## 2024-11-21 DIAGNOSIS — K57.92 DIVERTICULITIS: Primary | ICD-10-CM

## 2024-11-21 RX ORDER — METRONIDAZOLE 500 MG/1
500 TABLET ORAL 3 TIMES DAILY
Qty: 30 TABLET | Refills: 0 | Status: SHIPPED | OUTPATIENT
Start: 2024-11-21 | End: 2024-12-01

## 2024-11-21 RX ORDER — CIPROFLOXACIN 500 MG/1
500 TABLET, FILM COATED ORAL 2 TIMES DAILY
Qty: 20 TABLET | Refills: 0 | Status: SHIPPED | OUTPATIENT
Start: 2024-11-21 | End: 2024-12-01

## 2024-11-21 SDOH — ECONOMIC STABILITY: INCOME INSECURITY: HOW HARD IS IT FOR YOU TO PAY FOR THE VERY BASICS LIKE FOOD, HOUSING, MEDICAL CARE, AND HEATING?: NOT HARD AT ALL

## 2024-11-21 SDOH — ECONOMIC STABILITY: FOOD INSECURITY: WITHIN THE PAST 12 MONTHS, YOU WORRIED THAT YOUR FOOD WOULD RUN OUT BEFORE YOU GOT MONEY TO BUY MORE.: NEVER TRUE

## 2024-11-21 SDOH — ECONOMIC STABILITY: FOOD INSECURITY: WITHIN THE PAST 12 MONTHS, THE FOOD YOU BOUGHT JUST DIDN'T LAST AND YOU DIDN'T HAVE MONEY TO GET MORE.: NEVER TRUE

## 2024-11-21 ASSESSMENT — ENCOUNTER SYMPTOMS
VOMITING: 0
SHORTNESS OF BREATH: 0
NAUSEA: 0
SORE THROAT: 0
ABDOMINAL PAIN: 1
BLOOD IN STOOL: 1
DIARRHEA: 1

## 2024-11-21 NOTE — PROGRESS NOTES
Chief Complaint   Patient presents with    Abdominal Pain     Pt stated that he thinks he has diverticulitis flare up.    Diabetes    Medication Refill         Uriel Huang is a 49 y.o. male who presents complaining of LLQ abdominal pain and diarrhea x 5 days. Pt denies any associated N/V or fever. 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 ---  Pt did get a new job which is less stressful but with decreased income     Pt has a hx of diabetes and is on basaglar insulin 20 units daily and amaryl 4 mg BID and  states that he has been following a diabetic diet and reports average blood sugar readings around 220 but over the past week they have been elevated between 300 - 400      Patient did recent soft tissue US which showed that 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)      Pt has been following a low sodium diet     Pt has never had a screening colonoscopy     Pt is followed by Pulmonologist regarding his spiculated solid nodule in the RML lung and had recent biopsy which was Negative for malignancy     Pt is an ex smoker of 2.5 PPD and has been tobacco free x 2 years; Pt no longer is drinking alcohol; Positive marijuana use nightly for chronic foot burning pain x 4 years     Patient has been taking a daily Baby ASA given recent carotid doppler study which showed < 50% atherosclerosis B/L      Pt continues to take zyrtec and flonase daily for allergies     FHx negative for CAD, diabetes or lung cancer         Review of Systems   Constitutional:  Positive for fatigue. Negative for chills and fever.   HENT:  Negative for nosebleeds and sore throat.    Eyes:         Negative blurred vision or diplopia   Respiratory:  Negative for shortness of breath.    Cardiovascular: